# Patient Record
Sex: MALE | Race: WHITE | NOT HISPANIC OR LATINO | Employment: FULL TIME | URBAN - METROPOLITAN AREA
[De-identification: names, ages, dates, MRNs, and addresses within clinical notes are randomized per-mention and may not be internally consistent; named-entity substitution may affect disease eponyms.]

---

## 2023-09-26 ENCOUNTER — DOCUMENTATION (OUTPATIENT)
Dept: OTHER | Facility: HOSPITAL | Age: 58
End: 2023-09-26

## 2023-09-26 ENCOUNTER — OFFICE VISIT (OUTPATIENT)
Dept: PRIMARY CARE | Facility: CLINIC | Age: 58
End: 2023-09-26
Payer: COMMERCIAL

## 2023-09-26 ENCOUNTER — LAB (OUTPATIENT)
Dept: LAB | Facility: LAB | Age: 58
End: 2023-09-26
Payer: COMMERCIAL

## 2023-09-26 VITALS
DIASTOLIC BLOOD PRESSURE: 76 MMHG | BODY MASS INDEX: 29.42 KG/M2 | WEIGHT: 222 LBS | HEIGHT: 73 IN | SYSTOLIC BLOOD PRESSURE: 142 MMHG

## 2023-09-26 DIAGNOSIS — E78.5 HYPERLIPIDEMIA, UNSPECIFIED HYPERLIPIDEMIA TYPE: ICD-10-CM

## 2023-09-26 DIAGNOSIS — F17.200 SMOKING: ICD-10-CM

## 2023-09-26 DIAGNOSIS — J35.8 TONSILLAR MASS: ICD-10-CM

## 2023-09-26 DIAGNOSIS — R22.1 NECK MASS: ICD-10-CM

## 2023-09-26 DIAGNOSIS — J36 PERITONSILLAR ABSCESS: Primary | ICD-10-CM

## 2023-09-26 DIAGNOSIS — Z85.72 HX OF LYMPHOMA: ICD-10-CM

## 2023-09-26 PROBLEM — M51.9 ANNULAR TEAR OF INTERVERTEBRAL DISC: Status: ACTIVE | Noted: 2023-09-26

## 2023-09-26 PROBLEM — M54.2 NECK PAIN: Status: ACTIVE | Noted: 2023-09-26

## 2023-09-26 PROBLEM — M18.12 ARTHRITIS OF CARPOMETACARPAL (CMC) JOINT OF LEFT THUMB: Status: ACTIVE | Noted: 2023-09-26

## 2023-09-26 PROBLEM — M51.26 LUMBAR DISC DISPLACEMENT WITHOUT MYELOPATHY: Status: ACTIVE | Noted: 2023-09-26

## 2023-09-26 PROBLEM — I35.0 AORTIC STENOSIS, MILD: Status: ACTIVE | Noted: 2023-09-26

## 2023-09-26 PROBLEM — R01.1 SYSTOLIC MURMUR: Status: ACTIVE | Noted: 2023-09-26

## 2023-09-26 PROBLEM — M67.40 GANGLION CYST: Status: ACTIVE | Noted: 2023-09-26

## 2023-09-26 PROBLEM — R55 SYNCOPE: Status: ACTIVE | Noted: 2023-09-26

## 2023-09-26 PROBLEM — R74.8 ELEVATED LIVER ENZYMES: Status: ACTIVE | Noted: 2023-09-26

## 2023-09-26 PROBLEM — M50.90 CERVICAL DISC DISEASE: Status: ACTIVE | Noted: 2023-09-26

## 2023-09-26 PROBLEM — M79.645 PAIN OF LEFT THUMB: Status: ACTIVE | Noted: 2023-09-26

## 2023-09-26 PROBLEM — M54.9 BACK PAIN: Status: ACTIVE | Noted: 2023-09-26

## 2023-09-26 PROBLEM — L30.9 ECZEMA: Status: ACTIVE | Noted: 2023-09-26

## 2023-09-26 PROBLEM — C83.70: Status: ACTIVE | Noted: 2023-09-26

## 2023-09-26 PROBLEM — M54.16 RIGHT LUMBAR RADICULOPATHY: Status: ACTIVE | Noted: 2023-09-26

## 2023-09-26 PROBLEM — R10.9 ABDOMINAL PAIN: Status: ACTIVE | Noted: 2023-09-26

## 2023-09-26 PROBLEM — R94.31 ABNORMAL ECG: Status: ACTIVE | Noted: 2023-09-26

## 2023-09-26 LAB
ALANINE AMINOTRANSFERASE (SGPT) (U/L) IN SER/PLAS: 33 U/L (ref 10–52)
ALBUMIN (G/DL) IN SER/PLAS: 5 G/DL (ref 3.4–5)
ALKALINE PHOSPHATASE (U/L) IN SER/PLAS: 63 U/L (ref 33–120)
ANION GAP IN SER/PLAS: 12 MMOL/L (ref 10–20)
ASPARTATE AMINOTRANSFERASE (SGOT) (U/L) IN SER/PLAS: 18 U/L (ref 9–39)
BASOPHILS (10*3/UL) IN BLOOD BY AUTOMATED COUNT: 0.06 X10E9/L (ref 0–0.1)
BASOPHILS/100 LEUKOCYTES IN BLOOD BY AUTOMATED COUNT: 0.7 % (ref 0–2)
BILIRUBIN TOTAL (MG/DL) IN SER/PLAS: 0.5 MG/DL (ref 0–1.2)
CALCIUM (MG/DL) IN SER/PLAS: 10.7 MG/DL (ref 8.6–10.6)
CARBON DIOXIDE, TOTAL (MMOL/L) IN SER/PLAS: 29 MMOL/L (ref 21–32)
CHLORIDE (MMOL/L) IN SER/PLAS: 102 MMOL/L (ref 98–107)
CREATININE (MG/DL) IN SER/PLAS: 0.93 MG/DL (ref 0.5–1.3)
EOSINOPHILS (10*3/UL) IN BLOOD BY AUTOMATED COUNT: 0.23 X10E9/L (ref 0–0.7)
EOSINOPHILS/100 LEUKOCYTES IN BLOOD BY AUTOMATED COUNT: 2.6 % (ref 0–6)
ERYTHROCYTE DISTRIBUTION WIDTH (RATIO) BY AUTOMATED COUNT: 13.3 % (ref 11.5–14.5)
ERYTHROCYTE MEAN CORPUSCULAR HEMOGLOBIN CONCENTRATION (G/DL) BY AUTOMATED: 32.5 G/DL (ref 32–36)
ERYTHROCYTE MEAN CORPUSCULAR VOLUME (FL) BY AUTOMATED COUNT: 87 FL (ref 80–100)
ERYTHROCYTES (10*6/UL) IN BLOOD BY AUTOMATED COUNT: 6.01 X10E12/L (ref 4.5–5.9)
GFR MALE: >90 ML/MIN/1.73M2
GLUCOSE (MG/DL) IN SER/PLAS: 97 MG/DL (ref 74–99)
HEMATOCRIT (%) IN BLOOD BY AUTOMATED COUNT: 52.3 % (ref 41–52)
HEMOGLOBIN (G/DL) IN BLOOD: 17 G/DL (ref 13.5–17.5)
IMMATURE GRANULOCYTES/100 LEUKOCYTES IN BLOOD BY AUTOMATED COUNT: 0.4 % (ref 0–0.9)
LACTATE DEHYDROGENASE (U/L) IN SER/PLAS BY LAC->PYR RXN: 145 U/L (ref 84–246)
LEUKOCYTES (10*3/UL) IN BLOOD BY AUTOMATED COUNT: 8.9 X10E9/L (ref 4.4–11.3)
LYMPHOCYTES (10*3/UL) IN BLOOD BY AUTOMATED COUNT: 1.91 X10E9/L (ref 1.2–4.8)
LYMPHOCYTES/100 LEUKOCYTES IN BLOOD BY AUTOMATED COUNT: 21.5 % (ref 13–44)
MONOCYTES (10*3/UL) IN BLOOD BY AUTOMATED COUNT: 0.84 X10E9/L (ref 0.1–1)
MONOCYTES/100 LEUKOCYTES IN BLOOD BY AUTOMATED COUNT: 9.4 % (ref 2–10)
NEUTROPHILS (10*3/UL) IN BLOOD BY AUTOMATED COUNT: 5.81 X10E9/L (ref 1.2–7.7)
NEUTROPHILS/100 LEUKOCYTES IN BLOOD BY AUTOMATED COUNT: 65.4 % (ref 40–80)
NRBC (PER 100 WBCS) BY AUTOMATED COUNT: 0 /100 WBC (ref 0–0)
PLATELETS (10*3/UL) IN BLOOD AUTOMATED COUNT: 293 X10E9/L (ref 150–450)
POTASSIUM (MMOL/L) IN SER/PLAS: 5.4 MMOL/L (ref 3.5–5.3)
PROTEIN TOTAL: 8 G/DL (ref 6.4–8.2)
SODIUM (MMOL/L) IN SER/PLAS: 138 MMOL/L (ref 136–145)
UREA NITROGEN (MG/DL) IN SER/PLAS: 17 MG/DL (ref 6–23)

## 2023-09-26 PROCEDURE — 85025 COMPLETE CBC W/AUTO DIFF WBC: CPT

## 2023-09-26 PROCEDURE — 83615 LACTATE (LD) (LDH) ENZYME: CPT

## 2023-09-26 PROCEDURE — 36415 COLL VENOUS BLD VENIPUNCTURE: CPT

## 2023-09-26 PROCEDURE — 99214 OFFICE O/P EST MOD 30 MIN: CPT | Performed by: INTERNAL MEDICINE

## 2023-09-26 PROCEDURE — 80053 COMPREHEN METABOLIC PANEL: CPT

## 2023-09-26 RX ORDER — AMOXICILLIN AND CLAVULANATE POTASSIUM 875; 125 MG/1; MG/1
875 TABLET, FILM COATED ORAL 2 TIMES DAILY
Qty: 20 TABLET | Refills: 0 | Status: SHIPPED | OUTPATIENT
Start: 2023-09-26 | End: 2023-09-26

## 2023-09-26 RX ORDER — ASPIRIN 81 MG/1
81 TABLET ORAL DAILY
COMMUNITY

## 2023-09-26 RX ORDER — DOXYCYCLINE 100 MG/1
100 CAPSULE ORAL 2 TIMES DAILY
Qty: 20 CAPSULE | Refills: 0 | Status: SHIPPED | OUTPATIENT
Start: 2023-09-26 | End: 2023-09-26

## 2023-09-26 RX ORDER — IBUPROFEN 600 MG/1
600 TABLET ORAL 4 TIMES DAILY PRN
Qty: 90 TABLET | Refills: 5 | Status: SHIPPED | OUTPATIENT
Start: 2023-09-26 | End: 2023-09-26

## 2023-09-26 RX ORDER — LORATADINE 10 MG/1
10 TABLET ORAL DAILY
Qty: 30 TABLET | Refills: 0 | Status: SHIPPED | OUTPATIENT
Start: 2023-09-26 | End: 2023-09-26

## 2023-09-26 NOTE — PROGRESS NOTES
"Subjective   Patient ID: Gopal Blas is a 58 y.o. male who presents for Follow-up (Multiple medical issues).    This 58-year-old young man today came here for sore throat, congestion, left side tonsil enlargement.  He went to urgent care.  He was told he has a mass.  He got worried.  He came here follow-up on various conditions.  Appetite and weight are okay.  He can swallow food very well.  His swelling is mostly on the left side.    I have personally reviewed the patient's Past Medical History, Medications, Allergies, Social History, and Family History in the EMR.    Review of Systems   All other systems reviewed and are negative.    Objective   /76   Ht 1.854 m (6' 1\")   Wt 101 kg (222 lb)   BMI 29.29 kg/m²     Physical Exam  Vitals reviewed.   HENT:      Nose:      Comments: Postnasal drip.     Mouth/Throat:      Comments: Throat: Congested.  Cardiovascular:      Heart sounds: Normal heart sounds, S1 normal and S2 normal. No murmur heard.     No friction rub.   Pulmonary:      Effort: Pulmonary effort is normal.      Breath sounds: Normal breath sounds and air entry.   Abdominal:      Palpations: There is no hepatomegaly, splenomegaly or mass.   Musculoskeletal:      Right lower leg: No edema.      Left lower leg: No edema.   Lymphadenopathy:      Lower Body: No right inguinal adenopathy. No left inguinal adenopathy.   Neurological:      Cranial Nerves: Cranial nerves 2-12 are intact.      Sensory: No sensory deficit.      Motor: Motor function is intact.      Deep Tendon Reflexes: Reflexes are normal and symmetric.     Assessment/Plan   Problem List Items Addressed This Visit             ICD-10-CM       Cardiac and Vasculature    Hyperlipidemia E78.5     Other Visit Diagnoses         Codes    Peritonsillar abscess    -  Primary J36    Neck mass     R22.1    Relevant Medications    amoxicillin-pot clavulanate (Augmentin) 875-125 mg tablet    doxycycline (Vibramycin) 100 mg capsule    loratadine " (Claritin) 10 mg tablet    ibuprofen 600 mg tablet    Other Relevant Orders    CT soft tissue neck w IV contrast    Hx of lymphoma     Z85.72    Relevant Orders    CBC and Auto Differential    Comprehensive metabolic panel    Lactate dehydrogenase    Tonsillar mass     J35.8    Smoking     F17.200        1. Left peritonsillar abscess, infection.  Salt water gargles, steam.  Augmentin, doxycycline, Claritin, Motrin given.  2. History of Burkitt’s lymphoma, unusual.  Immediate blood count, chemistry ordered.  3. Mass in tonsil that really bother, but externally right tonsillar area look bigger to me than left.  Internally, left tonsil is enlarged.  4. Considering Burkitt’s lymphoma history, it is reasonable to do CT scan soft tissue neck to evaluate.  5. Smoking.  We will check on CT scan of the lungs to make sure there is nothing there.  6. I shall see him back in a week after testing.  7. Welcome back to my office.    Scribe Attestation  By signing my name below, I, Marivel Rollins, Femi   attest that this documentation has been prepared under the direction and in the presence of Gwen Collins MD.

## 2023-09-27 PROBLEM — J36 PERITONSILLAR ABSCESS: Status: ACTIVE | Noted: 2023-09-27

## 2023-09-27 PROBLEM — Z85.72 HX OF LYMPHOMA: Status: ACTIVE | Noted: 2023-09-27

## 2023-09-27 PROBLEM — J35.8 TONSILLAR MASS: Status: ACTIVE | Noted: 2023-09-27

## 2023-09-27 PROBLEM — R22.1 NECK MASS: Status: ACTIVE | Noted: 2023-09-27

## 2023-10-02 ENCOUNTER — ANCILLARY PROCEDURE (OUTPATIENT)
Dept: RADIOLOGY | Facility: CLINIC | Age: 58
End: 2023-10-02
Payer: COMMERCIAL

## 2023-10-02 DIAGNOSIS — R22.1 NECK MASS: ICD-10-CM

## 2023-10-02 PROCEDURE — 70491 CT SOFT TISSUE NECK W/DYE: CPT

## 2023-10-02 PROCEDURE — 2550000001 HC RX 255 CONTRASTS: Performed by: INTERNAL MEDICINE

## 2023-10-02 RX ADMIN — IOHEXOL 100 ML: 350 INJECTION, SOLUTION INTRAVENOUS at 08:59

## 2023-10-04 ENCOUNTER — OFFICE VISIT (OUTPATIENT)
Dept: PRIMARY CARE | Facility: CLINIC | Age: 58
End: 2023-10-04
Payer: COMMERCIAL

## 2023-10-04 VITALS
HEIGHT: 73 IN | WEIGHT: 222 LBS | DIASTOLIC BLOOD PRESSURE: 72 MMHG | SYSTOLIC BLOOD PRESSURE: 122 MMHG | BODY MASS INDEX: 29.42 KG/M2

## 2023-10-04 DIAGNOSIS — R59.0 CERVICAL LYMPHADENOPATHY: ICD-10-CM

## 2023-10-04 DIAGNOSIS — E83.52 SERUM CALCIUM ELEVATED: ICD-10-CM

## 2023-10-04 DIAGNOSIS — J02.9 PHARYNGITIS, UNSPECIFIED ETIOLOGY: Primary | ICD-10-CM

## 2023-10-04 PROCEDURE — 99214 OFFICE O/P EST MOD 30 MIN: CPT | Performed by: INTERNAL MEDICINE

## 2023-10-05 ENCOUNTER — LAB (OUTPATIENT)
Dept: LAB | Facility: LAB | Age: 58
End: 2023-10-05
Payer: COMMERCIAL

## 2023-10-05 DIAGNOSIS — R59.0 CERVICAL LYMPHADENOPATHY: ICD-10-CM

## 2023-10-05 LAB
ALBUMIN SERPL BCP-MCNC: 4.8 G/DL (ref 3.4–5)
ALP SERPL-CCNC: 53 U/L (ref 33–120)
ALT SERPL W P-5'-P-CCNC: 38 U/L (ref 10–52)
ANION GAP SERPL CALC-SCNC: 15 MMOL/L (ref 10–20)
AST SERPL W P-5'-P-CCNC: 22 U/L (ref 9–39)
BILIRUB SERPL-MCNC: 0.6 MG/DL (ref 0–1.2)
BUN SERPL-MCNC: 15 MG/DL (ref 6–23)
CALCIUM SERPL-MCNC: 10.4 MG/DL (ref 8.6–10.6)
CHLORIDE SERPL-SCNC: 104 MMOL/L (ref 98–107)
CO2 SERPL-SCNC: 25 MMOL/L (ref 21–32)
CREAT SERPL-MCNC: 0.89 MG/DL (ref 0.5–1.3)
GFR SERPL CREATININE-BSD FRML MDRD: >90 ML/MIN/1.73M*2
GLUCOSE SERPL-MCNC: 86 MG/DL (ref 74–99)
POTASSIUM SERPL-SCNC: 4.8 MMOL/L (ref 3.5–5.3)
PROT SERPL-MCNC: 7.4 G/DL (ref 6.4–8.2)
PTH-INTACT SERPL-MCNC: 51.6 PG/ML (ref 18.5–88)
SODIUM SERPL-SCNC: 139 MMOL/L (ref 136–145)

## 2023-10-05 PROCEDURE — 80053 COMPREHEN METABOLIC PANEL: CPT

## 2023-10-05 PROCEDURE — 36415 COLL VENOUS BLD VENIPUNCTURE: CPT

## 2023-10-05 PROCEDURE — 82397 CHEMILUMINESCENT ASSAY: CPT

## 2023-10-05 PROCEDURE — 83970 ASSAY OF PARATHORMONE: CPT

## 2023-10-05 NOTE — PROGRESS NOTES
"Subjective   Patient ID: Gopal Blas is a 58 y.o. male who presents for Follow-up (multiple issues.).    This 58-year-old gentleman man today came here for multiple issues.  He is somewhat better.  He feels his throat is getting better, but not completely gone.  He had a CT scan done.  He came here follow-up.  He has a history of Burkitt's lymphoma.  He came for follow-up on various conditions.    I have personally reviewed the patient's Past Medical History, Medications, Allergies, Social History, and Family History in the EMR.    Review of Systems   All other systems reviewed and are negative.    Objective   /72   Ht 1.854 m (6' 1\")   Wt 101 kg (222 lb)   BMI 29.29 kg/m²     Physical Exam  Vitals reviewed.   HENT:      Nose: Congestion present.      Comments: Postnasal drip.     Mouth/Throat:      Comments: Throat congested.  Cardiovascular:      Heart sounds: Normal heart sounds, S1 normal and S2 normal. No murmur heard.     No friction rub.   Pulmonary:      Effort: Pulmonary effort is normal.      Breath sounds: Normal breath sounds and air entry.   Abdominal:      Palpations: There is no hepatomegaly, splenomegaly or mass.   Musculoskeletal:      Right lower leg: No edema.      Left lower leg: No edema.   Lymphadenopathy:      Cervical: Cervical adenopathy present.      Lower Body: No right inguinal adenopathy. No left inguinal adenopathy.   Neurological:      Cranial Nerves: Cranial nerves 2-12 are intact.      Sensory: No sensory deficit.      Motor: Motor function is intact.      Deep Tendon Reflexes: Reflexes are normal and symmetric.     LAB WORK: Laboratory testing, CT scan reviewed.    Assessment/Plan   Problem List Items Addressed This Visit    None  Visit Diagnoses         Codes    Pharyngitis, unspecified etiology    -  Primary J02.9    Cervical lymphadenopathy     R59.0    Relevant Orders    Comprehensive Metabolic Panel    Parathyroid Hormone, Intact    PTH-Related Peptide    Referral " to ENT    Serum calcium elevated     E83.52        1. Pharyngitis, congestion, improving with antibiotic, good.  2. Cervical lymphadenopathy ______ not better.  LDH normal.  I am comfortable referring to Dr. Kamara for nasopharyngoscopy and possible biopsy.  3. Calcium, borderline high.  Concerning for Burkitt’s lymphoma, I am going to work him up for parathyroid hormone.  Work-up ordered.  4. I shall see him back in about four weeks’ time.  Meanwhile, I will do my best to get him seen by Dr. Kamara for nasopharyngoscopy and possible biopsy.    Scribe Attestation  By signing my name below, I, Femi Ledbetter attest that this documentation has been prepared under the direction and in the presence of Gwen Collins MD.

## 2023-10-06 PROBLEM — R06.09 DOE (DYSPNEA ON EXERTION): Status: ACTIVE | Noted: 2023-10-06

## 2023-10-06 PROBLEM — I25.10 CORONARY ARTERIOSCLEROSIS: Status: ACTIVE | Noted: 2023-10-06

## 2023-10-06 PROBLEM — R03.0 ELEVATED BLOOD PRESSURE READING: Status: ACTIVE | Noted: 2023-10-06

## 2023-10-06 PROBLEM — L30.9 DERMATITIS, UNSPECIFIED: Status: ACTIVE | Noted: 2021-08-31

## 2023-10-06 RX ORDER — ACETAMINOPHEN 500 MG
1 TABLET ORAL DAILY
COMMUNITY

## 2023-10-09 NOTE — PROGRESS NOTES
History of Present Illness    Gopal AICHA Blas is a 58 y.o. male seen at the request of his family physician.  Approximately 10 days ago he presented with a throat discomfort as well as of significant amount of swelling on the left side of his throat.  He was eventually put on antibiotics.  His symptoms got better.  He had a CT scan of his neck done on October 2, 2023.  I personally reviewed that scan that basically does not show anything worrisome besides may be a mild sinusitis.  The patient has noticed some swelling in his neck and is somewhat worried about that.  On further questioning the patient admits to significant snoring.    Past Medical History  His past medical history review of system shows elevated cholesterol, elevated blood pressure.  He also has a history of Burkitt's lymphoma when he was 7 years old.  has a current medication list which includes the following prescription(s): aspirin, cholecalciferol, doxycycline, ibuprofen, loratadine, rosuvastatin, and rosuvastatin calcium.     reports that he has been smoking cigarettes. He does not have any smokeless tobacco history on file. He reports that he does not drink alcohol and does not use drugs.  He works in a car dealership.  He is here today with his wife.        Physical Exam    The patient is alert and oriented. Examination of the external ears, ear canals, and eardrums, is within normal limits. Examination of the anterior and external nose is negative. Examination of the oral cavity and oropharynx is normal. There is no evidence of any mucosal lesions. There is good mobility of the tongue and palate.  He does have significant restriction of the oropharyngeal and left there is good mandibular excursion. Palpation of the parotid, neck, and thyroid field fails to show any worrisome masses or adenopathies.  What the patient feels are somewhat prominent submandibular glands.    A flexible laryngoscopy was carried out. Under topical Xylocaine and  Zohaib-Synephrine the scope was introduced through the nostril. The nasopharynx, base of tongue, hypopharynx, and larynx are visualized.  The vocal cords are normally mobile. There is no pooling of secretions in the piriform sinuses. There is no evidence of any mucosal lesions.  The airway is not restricted in the area of the base of tongue    Assessment and Plan    Recent oropharyngeal infection.  This has resolved.  His prominent neck masses correspond to his submandibular glands.  There is nothing worrisome on CT scan.  I would not pursue that any further.    Significant snoring and airway restriction on examination.  A sleep study will be obtained.    I will see him in 4 months.

## 2023-10-10 ENCOUNTER — OFFICE VISIT (OUTPATIENT)
Dept: OTOLARYNGOLOGY | Facility: CLINIC | Age: 58
End: 2023-10-10
Payer: COMMERCIAL

## 2023-10-10 VITALS — BODY MASS INDEX: 29.69 KG/M2 | WEIGHT: 224 LBS | HEIGHT: 73 IN

## 2023-10-10 DIAGNOSIS — R22.1 NECK MASS: Primary | ICD-10-CM

## 2023-10-10 DIAGNOSIS — R06.83 SNORING: ICD-10-CM

## 2023-10-10 PROCEDURE — 31575 DIAGNOSTIC LARYNGOSCOPY: CPT | Performed by: OTOLARYNGOLOGY

## 2023-10-10 PROCEDURE — 99203 OFFICE O/P NEW LOW 30 MIN: CPT | Performed by: OTOLARYNGOLOGY

## 2023-10-10 ASSESSMENT — PATIENT HEALTH QUESTIONNAIRE - PHQ9
SUM OF ALL RESPONSES TO PHQ9 QUESTIONS 1 AND 2: 0
2. FEELING DOWN, DEPRESSED OR HOPELESS: NOT AT ALL
1. LITTLE INTEREST OR PLEASURE IN DOING THINGS: NOT AT ALL

## 2023-10-11 LAB — PTH RELATED PROT SERPL-SCNC: <0.4 PMOL/L

## 2023-11-08 ENCOUNTER — PHARMACY VISIT (OUTPATIENT)
Dept: PHARMACY | Facility: CLINIC | Age: 58
End: 2023-11-08
Payer: COMMERCIAL

## 2023-11-10 ENCOUNTER — PHARMACY VISIT (OUTPATIENT)
Dept: PHARMACY | Facility: CLINIC | Age: 58
End: 2023-11-10
Payer: COMMERCIAL

## 2023-11-10 PROCEDURE — RXOTC WILLOW AMBULATORY OTC CHARGE

## 2023-11-10 PROCEDURE — RXMED WILLOW AMBULATORY MEDICATION CHARGE

## 2024-01-28 ENCOUNTER — HOSPITAL ENCOUNTER (EMERGENCY)
Facility: HOSPITAL | Age: 59
Discharge: HOME | End: 2024-01-29
Attending: EMERGENCY MEDICINE
Payer: COMMERCIAL

## 2024-01-28 DIAGNOSIS — J36 PERITONSILLAR ABSCESS: Primary | ICD-10-CM

## 2024-01-28 PROCEDURE — 99284 EMERGENCY DEPT VISIT MOD MDM: CPT | Performed by: EMERGENCY MEDICINE

## 2024-01-28 RX ORDER — KETOROLAC TROMETHAMINE 30 MG/ML
15 INJECTION, SOLUTION INTRAMUSCULAR; INTRAVENOUS ONCE
Status: COMPLETED | OUTPATIENT
Start: 2024-01-29 | End: 2024-01-29

## 2024-01-28 RX ORDER — DEXAMETHASONE SODIUM PHOSPHATE 100 MG/10ML
10 INJECTION INTRAMUSCULAR; INTRAVENOUS ONCE
Status: COMPLETED | OUTPATIENT
Start: 2024-01-29 | End: 2024-01-29

## 2024-01-28 RX ORDER — CEFTRIAXONE 1 G/50ML
1 INJECTION, SOLUTION INTRAVENOUS ONCE
Status: COMPLETED | OUTPATIENT
Start: 2024-01-29 | End: 2024-01-29

## 2024-01-28 ASSESSMENT — LIFESTYLE VARIABLES
HAVE YOU EVER FELT YOU SHOULD CUT DOWN ON YOUR DRINKING: NO
EVER FELT BAD OR GUILTY ABOUT YOUR DRINKING: NO
HAVE PEOPLE ANNOYED YOU BY CRITICIZING YOUR DRINKING: NO
EVER HAD A DRINK FIRST THING IN THE MORNING TO STEADY YOUR NERVES TO GET RID OF A HANGOVER: NO
REASON UNABLE TO ASSESS: NO

## 2024-01-28 NOTE — Clinical Note
Gopal Blas was seen and treated in our emergency department on 1/28/2024.  He may return to work on 01/31/2024.  Patient may return to work on the above date without restrictions     If you have any questions or concerns, please don't hesitate to call.      Tito Peacock, DO

## 2024-01-29 ENCOUNTER — PHARMACY VISIT (OUTPATIENT)
Dept: PHARMACY | Facility: CLINIC | Age: 59
End: 2024-01-29
Payer: COMMERCIAL

## 2024-01-29 ENCOUNTER — APPOINTMENT (OUTPATIENT)
Dept: RADIOLOGY | Facility: HOSPITAL | Age: 59
End: 2024-01-29
Payer: COMMERCIAL

## 2024-01-29 ENCOUNTER — OFFICE VISIT (OUTPATIENT)
Dept: OTOLARYNGOLOGY | Facility: CLINIC | Age: 59
End: 2024-01-29
Payer: COMMERCIAL

## 2024-01-29 VITALS — TEMPERATURE: 96.8 F | HEIGHT: 73 IN | WEIGHT: 224 LBS | BODY MASS INDEX: 29.69 KG/M2

## 2024-01-29 VITALS
BODY MASS INDEX: 30.48 KG/M2 | RESPIRATION RATE: 20 BRPM | HEIGHT: 73 IN | TEMPERATURE: 97.3 F | OXYGEN SATURATION: 93 % | SYSTOLIC BLOOD PRESSURE: 121 MMHG | HEART RATE: 89 BPM | DIASTOLIC BLOOD PRESSURE: 76 MMHG | WEIGHT: 230 LBS

## 2024-01-29 DIAGNOSIS — J03.90 ACUTE TONSILLITIS, UNSPECIFIED ETIOLOGY: ICD-10-CM

## 2024-01-29 DIAGNOSIS — R06.83 SNORING: Primary | ICD-10-CM

## 2024-01-29 DIAGNOSIS — J35.1 TONSILLAR HYPERTROPHY: ICD-10-CM

## 2024-01-29 LAB
ALBUMIN SERPL-MCNC: 4.1 G/DL (ref 3.5–5)
ALP BLD-CCNC: 80 U/L (ref 35–125)
ALT SERPL-CCNC: 19 U/L (ref 5–40)
ANION GAP SERPL CALC-SCNC: 11 MMOL/L
AST SERPL-CCNC: 18 U/L (ref 5–40)
BASOPHILS # BLD AUTO: 0.06 X10*3/UL (ref 0–0.1)
BASOPHILS NFR BLD AUTO: 0.5 %
BILIRUB SERPL-MCNC: <0.2 MG/DL (ref 0.1–1.2)
BUN SERPL-MCNC: 16 MG/DL (ref 8–25)
CALCIUM SERPL-MCNC: 9.6 MG/DL (ref 8.5–10.4)
CHLORIDE SERPL-SCNC: 103 MMOL/L (ref 97–107)
CO2 SERPL-SCNC: 24 MMOL/L (ref 24–31)
CREAT SERPL-MCNC: 1 MG/DL (ref 0.4–1.6)
EGFRCR SERPLBLD CKD-EPI 2021: 88 ML/MIN/1.73M*2
EOSINOPHIL # BLD AUTO: 0.42 X10*3/UL (ref 0–0.7)
EOSINOPHIL NFR BLD AUTO: 3.7 %
ERYTHROCYTE [DISTWIDTH] IN BLOOD BY AUTOMATED COUNT: 13.3 % (ref 11.5–14.5)
GLUCOSE SERPL-MCNC: 130 MG/DL (ref 65–99)
HCT VFR BLD AUTO: 45 % (ref 41–52)
HGB BLD-MCNC: 15.1 G/DL (ref 13.5–17.5)
IMM GRANULOCYTES # BLD AUTO: 0.04 X10*3/UL (ref 0–0.7)
IMM GRANULOCYTES NFR BLD AUTO: 0.4 % (ref 0–0.9)
LYMPHOCYTES # BLD AUTO: 2.18 X10*3/UL (ref 1.2–4.8)
LYMPHOCYTES NFR BLD AUTO: 19.1 %
MCH RBC QN AUTO: 28.7 PG (ref 26–34)
MCHC RBC AUTO-ENTMCNC: 33.6 G/DL (ref 32–36)
MCV RBC AUTO: 85 FL (ref 80–100)
MONOCYTES # BLD AUTO: 1.4 X10*3/UL (ref 0.1–1)
MONOCYTES NFR BLD AUTO: 12.3 %
NEUTROPHILS # BLD AUTO: 7.3 X10*3/UL (ref 1.2–7.7)
NEUTROPHILS NFR BLD AUTO: 64 %
NRBC BLD-RTO: 0 /100 WBCS (ref 0–0)
PLATELET # BLD AUTO: 266 X10*3/UL (ref 150–450)
POTASSIUM SERPL-SCNC: 4.1 MMOL/L (ref 3.4–5.1)
PROT SERPL-MCNC: 7.1 G/DL (ref 5.9–7.9)
RBC # BLD AUTO: 5.27 X10*6/UL (ref 4.5–5.9)
SODIUM SERPL-SCNC: 138 MMOL/L (ref 133–145)
WBC # BLD AUTO: 11.4 X10*3/UL (ref 4.4–11.3)

## 2024-01-29 PROCEDURE — 96375 TX/PRO/DX INJ NEW DRUG ADDON: CPT | Mod: 59

## 2024-01-29 PROCEDURE — 99203 OFFICE O/P NEW LOW 30 MIN: CPT | Performed by: OTOLARYNGOLOGY

## 2024-01-29 PROCEDURE — 96365 THER/PROPH/DIAG IV INF INIT: CPT | Mod: 59

## 2024-01-29 PROCEDURE — 31575 DIAGNOSTIC LARYNGOSCOPY: CPT | Performed by: OTOLARYNGOLOGY

## 2024-01-29 PROCEDURE — 2550000001 HC RX 255 CONTRASTS: Performed by: EMERGENCY MEDICINE

## 2024-01-29 PROCEDURE — 70491 CT SOFT TISSUE NECK W/DYE: CPT

## 2024-01-29 PROCEDURE — 36415 COLL VENOUS BLD VENIPUNCTURE: CPT | Performed by: EMERGENCY MEDICINE

## 2024-01-29 PROCEDURE — 2500000004 HC RX 250 GENERAL PHARMACY W/ HCPCS (ALT 636 FOR OP/ED): Performed by: EMERGENCY MEDICINE

## 2024-01-29 PROCEDURE — 70491 CT SOFT TISSUE NECK W/DYE: CPT | Mod: FOREIGN READ | Performed by: RADIOLOGY

## 2024-01-29 PROCEDURE — RXMED WILLOW AMBULATORY MEDICATION CHARGE

## 2024-01-29 PROCEDURE — 4004F PT TOBACCO SCREEN RCVD TLK: CPT | Performed by: OTOLARYNGOLOGY

## 2024-01-29 PROCEDURE — 80053 COMPREHEN METABOLIC PANEL: CPT | Performed by: EMERGENCY MEDICINE

## 2024-01-29 PROCEDURE — 85025 COMPLETE CBC W/AUTO DIFF WBC: CPT | Performed by: EMERGENCY MEDICINE

## 2024-01-29 RX ORDER — AMOXICILLIN AND CLAVULANATE POTASSIUM 875; 125 MG/1; MG/1
1 TABLET, FILM COATED ORAL EVERY 12 HOURS
Qty: 14 TABLET | Refills: 0 | Status: SHIPPED | OUTPATIENT
Start: 2024-01-29 | End: 2024-02-05

## 2024-01-29 RX ORDER — OXYCODONE AND ACETAMINOPHEN 5; 325 MG/1; MG/1
1 TABLET ORAL EVERY 6 HOURS PRN
Qty: 8 TABLET | Refills: 0 | Status: SHIPPED | OUTPATIENT
Start: 2024-01-29 | End: 2024-02-01

## 2024-01-29 RX ORDER — PREDNISONE 20 MG/1
TABLET ORAL
Qty: 26 TABLET | Refills: 0 | Status: SHIPPED | OUTPATIENT
Start: 2024-01-29 | End: 2024-02-09

## 2024-01-29 RX ADMIN — DEXAMETHASONE SODIUM PHOSPHATE 10 MG: 10 INJECTION INTRAMUSCULAR; INTRAVENOUS at 00:01

## 2024-01-29 RX ADMIN — CEFTRIAXONE SODIUM 1 G: 1 INJECTION, SOLUTION INTRAVENOUS at 00:01

## 2024-01-29 RX ADMIN — IOHEXOL 75 ML: 350 INJECTION, SOLUTION INTRAVENOUS at 00:50

## 2024-01-29 RX ADMIN — KETOROLAC TROMETHAMINE 15 MG: 30 INJECTION INTRAMUSCULAR; INTRAVENOUS at 00:01

## 2024-01-29 ASSESSMENT — PATIENT HEALTH QUESTIONNAIRE - PHQ9
SUM OF ALL RESPONSES TO PHQ9 QUESTIONS 1 & 2: 0
2. FEELING DOWN, DEPRESSED OR HOPELESS: NOT AT ALL
1. LITTLE INTEREST OR PLEASURE IN DOING THINGS: NOT AT ALL

## 2024-01-29 ASSESSMENT — COLUMBIA-SUICIDE SEVERITY RATING SCALE - C-SSRS
6. HAVE YOU EVER DONE ANYTHING, STARTED TO DO ANYTHING, OR PREPARED TO DO ANYTHING TO END YOUR LIFE?: NO
2. HAVE YOU ACTUALLY HAD ANY THOUGHTS OF KILLING YOURSELF?: NO
1. IN THE PAST MONTH, HAVE YOU WISHED YOU WERE DEAD OR WISHED YOU COULD GO TO SLEEP AND NOT WAKE UP?: NO

## 2024-01-29 ASSESSMENT — PAIN - FUNCTIONAL ASSESSMENT: PAIN_FUNCTIONAL_ASSESSMENT: 0-10

## 2024-01-29 NOTE — PROGRESS NOTES
MALLIKA Blas is a 58 y.o. male with a history of Burkitt's lymphoma as a child.  He presented to the emergency room last night with considerable swelling and sore throat on the left-hand side.  The symptoms began yesterday morning.  He has a history of similar symptoms back in October for which she saw Dr. Kamara and had CAT scan.  Nothing concerning was seen on the CAT scan.  He had another CAT scan in the emergency room last night demonstrating a small abscess in the left peritonsillar area although it is reportedly in the lingual tonsil area on the radiologist report.  He had considerable swelling on the CT scan.  He was treated with IV antibiotics and steroids and had marked improvement.  He is feeling dramatically better today.  He is swallowing and breathing well now.  He did not really have any history of tonsil problems prior to October.  He does snore.  Sleep study is recommended for him by Dr. Kamara and he has been reluctant to have this done      Past Medical History:   Diagnosis Date    Cancer (CMS/[facility])     High cholesterol     History of Burkitt's lymphoma     Radiculopathy, cervical region     Cervical radiculopathy    Sinus infection             Medications:     Current Outpatient Medications:     amoxicillin-pot clavulanate (Augmentin) 875-125 mg tablet, Take 1 tablet (875 mg) by mouth every 12 hours for 7 days., Disp: 14 tablet, Rfl: 0    aspirin 81 mg EC tablet, Take 1 tablet (81 mg) by mouth once daily., Disp: , Rfl:     cholecalciferol (Vitamin D-3) 5,000 Units tablet, Take 1 tablet (5,000 Units) by mouth once daily., Disp: , Rfl:     doxycycline (Vibramycin) 100 mg capsule, TAKE 1 CAPSULE BY MOUTH TWO TIMES A DAY FOR 10 DAYS. TAKE WITH AT LEAST 8 OUNCES OF WATER, DO NOT LIE DOWN FOR 30 MINUTES AFTER, Disp: 20 capsule, Rfl: 0    ibuprofen 600 mg tablet, TAKE 1 TABLET BY MOUTH FOUR TIMES A DAY AS NEEDED FOR MILD PAIN, Disp: 90 tablet, Rfl: 5    loratadine (Claritin) 10 mg tablet, TAKE 1  "TABLET BY MOUTH ONCE DAILY, Disp: 30 tablet, Rfl: 0    oxyCODONE-acetaminophen (Percocet) 5-325 mg tablet, Take 1 tablet by mouth every 6 hours if needed for severe pain (7 - 10) for up to 3 days., Disp: 8 tablet, Rfl: 0    predniSONE (Deltasone) 20 mg tablet, Take 4 tablets (80 mg) by mouth once daily for 2 days, THEN 3 tablets (60 mg) once daily for 3 days, THEN 2 tablets (40 mg) once daily for 3 days, THEN 1 tablet (20 mg) once daily for 3 days., Disp: 26 tablet, Rfl: 0    rosuvastatin (Crestor) 20 mg tablet, TAKE 1 TABLET DAILY., Disp: 90 tablet, Rfl: 1    rosuvastatin calcium (CRESTOR ORAL), Take 20 mg by mouth once daily., Disp: , Rfl:   No current facility-administered medications for this visit.     Allergies:  Allergies   Allergen Reactions    Simvastatin Other     Elevated LFTs    Tramadol Hcl Unknown        Physical Exam:  Last Recorded Vitals  Temperature 36 °C (96.8 °F), height 1.854 m (6' 1\"), weight 102 kg (224 lb).  General:     General appearance: Well-developed, well-nourished in no acute distress.       Voice:  normal       Head/face: Normal appearance; nontender to palpation     Facial nerve function: Normal and symmetric bilaterally.    Oral/oropharynx:     Oral vestibule: Normal labial and gingival mucosa     Tongue/floor of mouth: Normal without lesion     Oropharynx: Clear.  No lesions present of the hard/soft palate, posterior pharynx 2+ tonsils bilaterally.  Erythema left soft palate and some mild fullness of the left peritonsillar area.  Uvula midline.  Tonsils cryptic bilaterally    Neck:     Neck: Normal appearance, trachea midline     Salivary glands: Normal to palpation bilaterally     Lymph nodes: No cervical lymphadenopathy to palpation     Thyroid: No thyromegaly.  No palpable nodules     Range of motion: Normal    Neurological:     Cortical functions: Alert and oriented x3, appropriate affect       Larynx/hypopharynx:     Laryngeal findings: Mirror exam inadequate or limited " secondary to enlarged base of tongue and/or excessive gagging.  Flexible laryngoscopy performed secondary to inadequate mirror examination.  Verbal informed consent the flexible laryngoscope was passed through the nasal cavity.  Normal epiglottis, aryepiglottic folds, arytenoids, false vocal cords, true vocal cords.  Normal vocal cord mobility bilaterally was identified.  No mucosal masses or lesions.    Nasopharynx:     Nasopharynx: Normal    Ear:     Ear canal: Normal bilaterally     Tympanic membrane: Intact and mobile bilaterally     Pinna: Normal bilaterally     Hearing:  Gross hearing assessment normal by voice    Nose:     Visualized using: Anterior rhinoscopy     Nasopharynx: Inadequate mirror exam secondary to gag, anatomy.       Nasal dorsum: Nontraumatic midline appearance     Septum: Midline     Inferior turbinates: Normally sized     Mucosa: Bilateral, pink, normal appearing       Assessment/Plan   Left tonsillitis with small peritonsillar abscess.  Seems to be considerably improved on antibiotics and steroids.  I recommended monitoring this and we will recheck him at the conclusion of his course.  I would like to see him sooner if he has any worsening before then.  Regarding long-term management, with his history of these 2 episodes within short order, there may be a role for more to be done but we discussed treating medically for now and seeing how he continues to do.         Tyler Mcfadden MD

## 2024-01-29 NOTE — ED PROVIDER NOTES
HPI   Chief Complaint   Patient presents with    Oral Swelling     Left sided throat swelling       Patient presents emerged apartment today with complaints of having swelling in his throat and palate.  Patient is complaining of having a sore throat.  Patient states that he did have something similar to this in the past, did have to put on antibiotics for it patient did not have to receive surgery last time.  The patient states that he feels as though his throat is closing up.  Patient was brought directly back into the ED after arriving here with the above complaints.  Patient denies fevers or chills.  Patient denies nausea or vomiting.  Patient denies other associated symptoms.  Patient states that he felt fine prior to going to bed.  Patient states that he woke up with a sore throat and a swollen throat.        [place] Coma Scale Score: 15                  Patient History   Past Medical History:   Diagnosis Date    Cancer (CMS/[facility])     High cholesterol     History of Burkitt's lymphoma     Radiculopathy, cervical region     Cervical radiculopathy    Sinus infection      Past Surgical History:   Procedure Laterality Date    CARPAL TUNNEL RELEASE  06/23/2016    Neuroplasty Decompression Median Nerve At Carpal Tunnel    OTHER SURGICAL HISTORY  08/09/2014    Arthrodesis Cervical    OTHER SURGICAL HISTORY  09/24/2014    Arthrodesis Cervical    OTHER SURGICAL HISTORY  09/24/2014    Post Spinal Diskectomy, Osteophytectomy One Lumbar Interspac    OTHER SURGICAL HISTORY  06/23/2016    Hemilaminectomy With Disc Removal Lumbar Re-Exploration    OTHER SURGICAL HISTORY  07/07/2018    Excision Of Thymus     Family History   Problem Relation Name Age of Onset    Coronary artery disease Mother      Hypertension Mother      Hyperlipidemia Mother      No Known Problems Father       Social History     Tobacco Use    Smoking status: Every Day     Types: Cigarettes    Smokeless tobacco: Never   Substance Use Topics    Alcohol use:  Never    Drug use: Never       Physical Exam   ED Triage Vitals [01/29/24 0014]   Temperature Heart Rate Respirations BP   36.3 °C (97.3 °F) 89 20 134/77      Pulse Ox Temp Source Heart Rate Source Patient Position   99 % Oral Monitor --      BP Location FiO2 (%)     -- --       Physical Exam  Vitals and nursing note reviewed.   Constitutional:       General: He is not in acute distress.     Appearance: He is well-developed.   HENT:      Head: Normocephalic and atraumatic.      Mouth/Throat:      Comments: Uvula is shifted to the right, with fullness of the left soft palate.  Eyes:      Conjunctiva/sclera: Conjunctivae normal.   Cardiovascular:      Rate and Rhythm: Normal rate and regular rhythm.      Heart sounds: No murmur heard.  Pulmonary:      Effort: Pulmonary effort is normal. No respiratory distress.      Breath sounds: Normal breath sounds.   Abdominal:      Palpations: Abdomen is soft.      Tenderness: There is no abdominal tenderness.   Musculoskeletal:         General: No swelling.      Cervical back: Neck supple.   Skin:     General: Skin is warm and dry.      Capillary Refill: Capillary refill takes less than 2 seconds.   Neurological:      Mental Status: He is alert.         ED Course & MDM   ED Course as of 01/29/24 0228 Mon Jan 29, 2024   0151 I did speak with Dr. Mcfadden over the phone, he is going to review the patient's CT scan, and try to go over the plan for this patient. [FR]   0222 Current is going to see the patient in the office today.  He is recommending not trying to drain the abscess here in the emergency department.  He is recommending steroids and antibiotics for home. [FR]      ED Course User Index  [FR] Tito Peacock DO         Diagnoses as of 01/29/24 0228   Peritonsillar abscess       Medical Decision Making  After my initial evaluation, patient, my estimation, will need to have a CT of the soft tissue of the neck in order to rule out possibility of a pharyngeal abscess that  will require surgery.  Patient will be given steroids, and medication to help with the swelling.  Patient was given IV antibiotics.  Patient will be given IV fluids.    Amount and/or Complexity of Data Reviewed  Labs:  Decision-making details documented in ED Course.  Radiology:  Decision-making details documented in ED Course.  ECG/medicine tests:  Decision-making details documented in ED Course.        Procedure  Procedures     Tito Peacock DO  01/29/24 0228

## 2024-03-11 ENCOUNTER — PHARMACY VISIT (OUTPATIENT)
Dept: PHARMACY | Facility: CLINIC | Age: 59
End: 2024-03-11
Payer: COMMERCIAL

## 2024-03-11 ENCOUNTER — OFFICE VISIT (OUTPATIENT)
Dept: OTOLARYNGOLOGY | Facility: CLINIC | Age: 59
End: 2024-03-11
Payer: COMMERCIAL

## 2024-03-11 VITALS — WEIGHT: 223 LBS | BODY MASS INDEX: 29.55 KG/M2 | HEIGHT: 73 IN | TEMPERATURE: 97.3 F

## 2024-03-11 DIAGNOSIS — B99.9 INFECTION: ICD-10-CM

## 2024-03-11 DIAGNOSIS — J03.91 RECURRENT TONSILLITIS: ICD-10-CM

## 2024-03-11 DIAGNOSIS — J36 PERITONSILLAR ABSCESS: Primary | ICD-10-CM

## 2024-03-11 PROCEDURE — 4004F PT TOBACCO SCREEN RCVD TLK: CPT | Performed by: OTOLARYNGOLOGY

## 2024-03-11 PROCEDURE — RXMED WILLOW AMBULATORY MEDICATION CHARGE

## 2024-03-11 PROCEDURE — 87070 CULTURE OTHR SPECIMN AEROBIC: CPT

## 2024-03-11 PROCEDURE — 99213 OFFICE O/P EST LOW 20 MIN: CPT | Performed by: OTOLARYNGOLOGY

## 2024-03-11 PROCEDURE — 87185 SC STD ENZYME DETCJ PER NZM: CPT

## 2024-03-11 PROCEDURE — 42700 I&D ABSCESS PERITONSILLAR: CPT | Performed by: OTOLARYNGOLOGY

## 2024-03-11 PROCEDURE — 87205 SMEAR GRAM STAIN: CPT

## 2024-03-11 PROCEDURE — 87075 CULTR BACTERIA EXCEPT BLOOD: CPT

## 2024-03-11 RX ORDER — AMOXICILLIN AND CLAVULANATE POTASSIUM 875; 125 MG/1; MG/1
1 TABLET, FILM COATED ORAL 2 TIMES DAILY
Qty: 20 TABLET | Refills: 0 | Status: SHIPPED | OUTPATIENT
Start: 2024-03-11 | End: 2024-03-21

## 2024-03-11 RX ORDER — PREDNISONE 5 MG/1
TABLET ORAL
Qty: 21 TABLET | Refills: 0 | Status: SHIPPED | OUTPATIENT
Start: 2024-03-11 | End: 2024-03-17

## 2024-03-11 NOTE — PROGRESS NOTES
MALLIKA Blas is a 58 y.o. male has done well since January until the last couple days with again left-sided throat pain.  He is breathing well.  Feels a little bit better since last night but he has not been able to sleep.    Prior history: with a history of Burkitt's lymphoma as a child.  He presented to the emergency room last night with considerable swelling and sore throat on the left-hand side.  The symptoms began yesterday morning.  He has a history of similar symptoms back in October for which she saw Dr. Kamara and had CAT scan.  Nothing concerning was seen on the CAT scan.  He had another CAT scan in the emergency room last night demonstrating a small abscess in the left peritonsillar area although it is reportedly in the lingual tonsil area on the radiologist report.  He had considerable swelling on the CT scan.  He was treated with IV antibiotics and steroids and had marked improvement.  He is feeling dramatically better today.  He is swallowing and breathing well now.  He did not really have any history of tonsil problems prior to October.  He does snore.  Sleep study is recommended for him by Dr. Kamara and he has been reluctant to have this done      Past Medical History:   Diagnosis Date    Cancer (CMS/[facility])     High cholesterol     History of Burkitt's lymphoma     Radiculopathy, cervical region     Cervical radiculopathy    Sinus infection             Medications:     Current Outpatient Medications:     cholecalciferol (Vitamin D-3) 5,000 Units tablet, Take 1 tablet (5,000 Units) by mouth once daily., Disp: , Rfl:     ibuprofen 600 mg tablet, TAKE 1 TABLET BY MOUTH FOUR TIMES A DAY AS NEEDED FOR MILD PAIN, Disp: 90 tablet, Rfl: 5    rosuvastatin calcium (CRESTOR ORAL), Take 20 mg by mouth once daily., Disp: , Rfl:     aspirin 81 mg EC tablet, Take 1 tablet (81 mg) by mouth once daily., Disp: , Rfl:     doxycycline (Vibramycin) 100 mg capsule, TAKE 1 CAPSULE BY MOUTH TWO TIMES A DAY FOR 10  "DAYS. TAKE WITH AT LEAST 8 OUNCES OF WATER, DO NOT LIE DOWN FOR 30 MINUTES AFTER (Patient not taking: Reported on 3/11/2024), Disp: 20 capsule, Rfl: 0    loratadine (Claritin) 10 mg tablet, TAKE 1 TABLET BY MOUTH ONCE DAILY (Patient not taking: Reported on 3/11/2024), Disp: 30 tablet, Rfl: 0    rosuvastatin (Crestor) 20 mg tablet, TAKE 1 TABLET DAILY., Disp: 90 tablet, Rfl: 1     Allergies:  Allergies   Allergen Reactions    Simvastatin Other     Elevated LFTs    Tramadol Hcl Unknown        Physical Exam:  Last Recorded Vitals  Temperature 36.3 °C (97.3 °F), height 1.854 m (6' 1\"), weight 101 kg (223 lb).  General:     General appearance: Well-developed, well-nourished in no acute distress.       Voice:  normal       Head/face: Normal appearance; nontender to palpation     Facial nerve function: Normal and symmetric bilaterally.    Oral/oropharynx:     Oral vestibule: Normal labial and gingival mucosa     Tongue/floor of mouth: Normal without lesion     Oropharynx: Clear.  No lesions present of the hard/soft palate, posterior pharynx 2+ tonsils bilaterally.  Erythema left soft palate with worsened asymmetry and bulging on the left.  Aspirated as below.    Neck:     Neck: Normal appearance, trachea midline     Salivary glands: Normal to palpation bilaterally     Lymph nodes: No cervical lymphadenopathy to palpation     Thyroid: No thyromegaly.  No palpable nodules     Range of motion: Normal    Neurological:     Cortical functions: Alert and oriented x3, appropriate affect       Larynx/hypopharynx:     Laryngeal findings: Mirror exam inadequate or limited secondary to enlarged base of tongue and/or excessive gagging.      Nasopharynx:     Nasopharynx: Normal    Ear:     Ear canal: Normal bilaterally     Tympanic membrane: Intact and mobile bilaterally     Pinna: Normal bilaterally     Hearing:  Gross hearing assessment normal by voice    Nose:     Visualized using: Anterior rhinoscopy     Nasopharynx: Inadequate " mirror exam secondary to gag, anatomy.       Nasal dorsum: Nontraumatic midline appearance     Septum: Midline     Inferior turbinates: Normally sized     Mucosa: Bilateral, pink, normal appearing       Assessment/Plan   Left tonsillitis with peritonsillar abscess.  With his verbal consent, aspirated and got about 5 cc of pus.  Cultured.  Incision and drainage performed with 15 blade.  Not much in the way of additional pus identified but cavity was fully explored.  Antibiotics and prednisone sent.  I will see him back in 2 weeks.  He has history may warrant tonsillectomy although he has not yet had drainage of this abscess and this may be hold over from January.  We will see how he does          Tyler Mcfadden MD

## 2024-03-14 LAB
B-LACTAMASE ORGANISM ISLT: NEGATIVE
BACTERIA SPEC CULT: NORMAL
BACTERIA SPEC CULT: NORMAL
GRAM STN SPEC: NORMAL
GRAM STN SPEC: NORMAL

## 2024-03-15 DIAGNOSIS — E78.2 MIXED HYPERLIPIDEMIA: Primary | ICD-10-CM

## 2024-03-15 PROCEDURE — RXMED WILLOW AMBULATORY MEDICATION CHARGE

## 2024-03-15 RX ORDER — ROSUVASTATIN CALCIUM 20 MG/1
20 TABLET, COATED ORAL DAILY
Qty: 90 TABLET | Refills: 0 | Status: SHIPPED | OUTPATIENT
Start: 2024-03-15 | End: 2024-06-16

## 2024-03-18 ENCOUNTER — PHARMACY VISIT (OUTPATIENT)
Dept: PHARMACY | Facility: CLINIC | Age: 59
End: 2024-03-18
Payer: COMMERCIAL

## 2024-03-27 ENCOUNTER — OFFICE VISIT (OUTPATIENT)
Dept: OTOLARYNGOLOGY | Facility: CLINIC | Age: 59
End: 2024-03-27
Payer: COMMERCIAL

## 2024-03-27 DIAGNOSIS — J35.1 TONSILLAR HYPERTROPHY: ICD-10-CM

## 2024-03-27 DIAGNOSIS — J36 PERITONSILLAR ABSCESS: Primary | ICD-10-CM

## 2024-03-27 PROCEDURE — 4004F PT TOBACCO SCREEN RCVD TLK: CPT | Performed by: OTOLARYNGOLOGY

## 2024-03-27 PROCEDURE — 99213 OFFICE O/P EST LOW 20 MIN: CPT | Performed by: OTOLARYNGOLOGY

## 2024-03-27 NOTE — PROGRESS NOTES
MALLIKA Blas is a 58 y.o. male status post incision and drainage left peritonsillar abscess 3/11/2024.  He has finished his antibiotics.  He is feeling much better.  No pain.  Swallowing well.      Prior history: with a history of Burkitt's lymphoma as a child.  He presented to the emergency room last night with considerable swelling and sore throat on the left-hand side.  The symptoms began yesterday morning.  He has a history of similar symptoms back in October for which she saw Dr. Kamara and had CAT scan.  Nothing concerning was seen on the CAT scan.  He had another CAT scan in the emergency room last night demonstrating a small abscess in the left peritonsillar area although it is reportedly in the lingual tonsil area on the radiologist report.  He had considerable swelling on the CT scan.  He was treated with IV antibiotics and steroids and had marked improvement.  He is feeling dramatically better today.  He is swallowing and breathing well now.  He did not really have any history of tonsil problems prior to October.  He does snore.  Sleep study is recommended for him by Dr. Kamara and he has been reluctant to have this done      Past Medical History:   Diagnosis Date    Cancer (CMS/[facility])     High cholesterol     History of Burkitt's lymphoma     Radiculopathy, cervical region     Cervical radiculopathy    Sinus infection             Medications:     Current Outpatient Medications:     aspirin 81 mg EC tablet, Take 1 tablet (81 mg) by mouth once daily., Disp: , Rfl:     cholecalciferol (Vitamin D-3) 5,000 Units tablet, Take 1 tablet (5,000 Units) by mouth once daily., Disp: , Rfl:     doxycycline (Vibramycin) 100 mg capsule, TAKE 1 CAPSULE BY MOUTH TWO TIMES A DAY FOR 10 DAYS. TAKE WITH AT LEAST 8 OUNCES OF WATER, DO NOT LIE DOWN FOR 30 MINUTES AFTER, Disp: 20 capsule, Rfl: 0    ibuprofen 600 mg tablet, TAKE 1 TABLET BY MOUTH FOUR TIMES A DAY AS NEEDED FOR MILD PAIN, Disp: 90 tablet, Rfl: 5     loratadine (Claritin) 10 mg tablet, TAKE 1 TABLET BY MOUTH ONCE DAILY, Disp: 30 tablet, Rfl: 0    rosuvastatin (Crestor) 20 mg tablet, TAKE 1 TABLET DAILY., Disp: 90 tablet, Rfl: 0    rosuvastatin calcium (CRESTOR ORAL), Take 20 mg by mouth once daily., Disp: , Rfl:      Allergies:  Allergies   Allergen Reactions    Simvastatin Other     Elevated LFTs    Tramadol Hcl Unknown        Physical Exam:  Last Recorded Vitals  There were no vitals taken for this visit.  General:     General appearance: Well-developed, well-nourished in no acute distress.       Voice:  normal       Head/face: Normal appearance; nontender to palpation     Facial nerve function: Normal and symmetric bilaterally.    Oral/oropharynx:     Oral vestibule: Normal labial and gingival mucosa     Tongue/floor of mouth: Normal without lesion     Oropharynx: Clear.  No lesions present of the hard/soft palate, posterior pharynx 2+ tonsils bilaterally.  No further erythema.  I&D site has healed    Neck:     Neck: Normal appearance, trachea midline     Salivary glands: Normal to palpation bilaterally     Lymph nodes: No cervical lymphadenopathy to palpation     Thyroid: No thyromegaly.  No palpable nodules     Range of motion: Normal    Neurological:     Cortical functions: Alert and oriented x3, appropriate affect       Larynx/hypopharynx:     Laryngeal findings: Mirror exam inadequate or limited secondary to enlarged base of tongue and/or excessive gagging.      Nasopharynx:     Nasopharynx: Normal    Ear:     Ear canal: Normal bilaterally     Tympanic membrane: Intact and mobile bilaterally     Pinna: Normal bilaterally     Hearing:  Gross hearing assessment normal by voice    Nose:     Visualized using: Anterior rhinoscopy     Nasopharynx: Inadequate mirror exam secondary to gag, anatomy.       Nasal dorsum: Nontraumatic midline appearance     Septum: Midline     Inferior turbinates: Normally sized     Mucosa: Bilateral, pink, normal appearing        Assessment/Plan   Left tonsillitis with peritonsillar abscess has now healed status post incision and drainage.  We discussed the role of tonsillectomy but he is doing well and I am hopeful that this has been definitive treatment and he will not require this.  We will watch and he will call with any recurrence of symptoms and if he has further problems, tonsillectomy will be likely recommended at that time         Tyler Mcfadden MD

## 2024-06-10 ENCOUNTER — PHARMACY VISIT (OUTPATIENT)
Dept: PHARMACY | Facility: CLINIC | Age: 59
End: 2024-06-10
Payer: COMMERCIAL

## 2024-06-10 ENCOUNTER — TELEPHONE (OUTPATIENT)
Dept: OTOLARYNGOLOGY | Facility: CLINIC | Age: 59
End: 2024-06-10
Payer: COMMERCIAL

## 2024-06-10 DIAGNOSIS — B99.9 INFECTION: Primary | ICD-10-CM

## 2024-06-10 PROCEDURE — RXMED WILLOW AMBULATORY MEDICATION CHARGE

## 2024-06-10 RX ORDER — AMOXICILLIN 875 MG/1
TABLET, FILM COATED ORAL
Qty: 20 TABLET | Refills: 0 | Status: SHIPPED | OUTPATIENT
Start: 2024-06-10

## 2024-06-10 NOTE — TELEPHONE ENCOUNTER
Pt c/o lump in back of throat again, Had PTA 3/27/24. He is at work, cannot sleep, facial swelling. Can Dr Mcfadden send in Abx and steroids. He is trying to keep up with fluids. 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

## 2024-06-11 ENCOUNTER — TELEPHONE (OUTPATIENT)
Dept: OTOLARYNGOLOGY | Facility: CLINIC | Age: 59
End: 2024-06-11
Payer: COMMERCIAL

## 2024-06-11 DIAGNOSIS — B99.9 INFECTION: Primary | ICD-10-CM

## 2024-06-11 PROCEDURE — RXMED WILLOW AMBULATORY MEDICATION CHARGE

## 2024-06-11 RX ORDER — PREDNISONE 5 MG/1
TABLET ORAL
Qty: 21 TABLET | Refills: 0 | Status: SHIPPED | OUTPATIENT
Start: 2024-06-11

## 2024-06-11 NOTE — TELEPHONE ENCOUNTER
Wife Ann called.  She said that she knows he just started the augmentin but he is in a lot of pain and not getting any better. Do you want to add steroids? Do you want to see him tomorrow?

## 2024-06-12 ENCOUNTER — PHARMACY VISIT (OUTPATIENT)
Dept: PHARMACY | Facility: CLINIC | Age: 59
End: 2024-06-12
Payer: COMMERCIAL

## 2024-06-12 ENCOUNTER — OFFICE VISIT (OUTPATIENT)
Dept: OTOLARYNGOLOGY | Facility: CLINIC | Age: 59
End: 2024-06-12
Payer: COMMERCIAL

## 2024-06-12 VITALS — TEMPERATURE: 97.5 F | WEIGHT: 218.4 LBS | HEIGHT: 73 IN | BODY MASS INDEX: 28.94 KG/M2

## 2024-06-12 DIAGNOSIS — J03.91 RECURRENT TONSILLITIS: ICD-10-CM

## 2024-06-12 DIAGNOSIS — J36 PERITONSILLAR ABSCESS: Primary | ICD-10-CM

## 2024-06-12 PROCEDURE — 42700 I&D ABSCESS PERITONSILLAR: CPT | Performed by: OTOLARYNGOLOGY

## 2024-06-12 PROCEDURE — 4004F PT TOBACCO SCREEN RCVD TLK: CPT | Performed by: OTOLARYNGOLOGY

## 2024-06-12 PROCEDURE — 99213 OFFICE O/P EST LOW 20 MIN: CPT | Performed by: OTOLARYNGOLOGY

## 2024-06-12 NOTE — PROGRESS NOTES
MALLIKA Blas is a 59 y.o. male status post incision and drainage left peritonsillar abscess 3/11/2024.  After this fully resolved he had been doing well until recently when he again developed sore throat and spontaneous drainage left oropharynx.  He is feeling better today with prednisone added to the antibiotics that was started a couple days ago.    Prior history: with a history of Burkitt's lymphoma as a child.  He presented to the emergency room last night with considerable swelling and sore throat on the left-hand side.  The symptoms began yesterday morning.  He has a history of similar symptoms back in October for which she saw Dr. Kamara and had CAT scan.  Nothing concerning was seen on the CAT scan.  He had another CAT scan in the emergency room last night demonstrating a small abscess in the left peritonsillar area although it is reportedly in the lingual tonsil area on the radiologist report.  He had considerable swelling on the CT scan.  He was treated with IV antibiotics and steroids and had marked improvement.  He is feeling dramatically better today.  He is swallowing and breathing well now.  He did not really have any history of tonsil problems prior to October.  He does snore.  Sleep study is recommended for him by Dr. Kamara and he has been reluctant to have this done      Past Medical History:   Diagnosis Date    Cancer (Multi)     High cholesterol     History of Burkitt's lymphoma     Radiculopathy, cervical region     Cervical radiculopathy    Sinus infection             Medications:     Current Outpatient Medications:     amoxicillin (Amoxil) 875 mg tablet, Take 1 tablet by mouth twice daily for 10 days., Disp: 20 tablet, Rfl: 0    aspirin 81 mg EC tablet, Take 1 tablet (81 mg) by mouth once daily., Disp: , Rfl:     ibuprofen 600 mg tablet, TAKE 1 TABLET BY MOUTH FOUR TIMES A DAY AS NEEDED FOR MILD PAIN, Disp: 90 tablet, Rfl: 5    predniSONE (Deltasone) 5 mg tablet, Take 6 tabs by  "mouth daily x 1 day, then 5 tabs daily x 1 day, then 4 tabs daily x 1 day, then 3 tabs daily x 1 day, then 2 tabs daily x 1 day, then 1 tab daily x 1 day, Disp: 21 tablet, Rfl: 0    rosuvastatin (Crestor) 20 mg tablet, TAKE 1 TABLET DAILY., Disp: 90 tablet, Rfl: 0    rosuvastatin calcium (CRESTOR ORAL), Take 20 mg by mouth once daily., Disp: , Rfl:     cholecalciferol (Vitamin D-3) 5,000 Units tablet, Take 1 tablet (5,000 Units) by mouth once daily., Disp: , Rfl:     doxycycline (Vibramycin) 100 mg capsule, TAKE 1 CAPSULE BY MOUTH TWO TIMES A DAY FOR 10 DAYS. TAKE WITH AT LEAST 8 OUNCES OF WATER, DO NOT LIE DOWN FOR 30 MINUTES AFTER (Patient not taking: Reported on 6/12/2024), Disp: 20 capsule, Rfl: 0    loratadine (Claritin) 10 mg tablet, TAKE 1 TABLET BY MOUTH ONCE DAILY (Patient not taking: Reported on 6/12/2024), Disp: 30 tablet, Rfl: 0     Allergies:  Allergies   Allergen Reactions    Simvastatin Other     Elevated LFTs    Tramadol Hcl Unknown        Physical Exam:  Last Recorded Vitals  Temperature 36.4 °C (97.5 °F), height 1.854 m (6' 1\"), weight 99.1 kg (218 lb 6.4 oz).  General:     General appearance: Well-developed, well-nourished in no acute distress.       Voice:  normal       Head/face: Normal appearance; nontender to palpation     Facial nerve function: Normal and symmetric bilaterally.    Oral/oropharynx:     Oral vestibule: Normal labial and gingival mucosa     Tongue/floor of mouth: Normal without lesion     Oropharynx: Clear.  No lesions present of the hard/soft palate, posterior pharynx 2+ tonsils bilaterally.  Left side erythematous with a small pinhole area of purulent drainage.  With his verbal informed consent, this was locally anesthetized and enlarged with an 11 blade.  There was not much purulence beyond that    Neck:     Neck: Normal appearance, trachea midline     Salivary glands: Normal to palpation bilaterally     Lymph nodes: No cervical lymphadenopathy to palpation     Thyroid: No " thyromegaly.  No palpable nodules     Range of motion: Normal    Neurological:     Cortical functions: Alert and oriented x3, appropriate affect       Larynx/hypopharynx:     Laryngeal findings: Mirror exam inadequate or limited secondary to enlarged base of tongue and/or excessive gagging.      Nasopharynx:     Nasopharynx: Normal    Ear:     Ear canal: Normal bilaterally     Tympanic membrane: Intact and mobile bilaterally     Pinna: Normal bilaterally     Hearing:  Gross hearing assessment normal by voice    Nose:     Visualized using: Anterior rhinoscopy     Nasopharynx: Inadequate mirror exam secondary to gag, anatomy.       Nasal dorsum: Nontraumatic midline appearance     Septum: Midline     Inferior turbinates: Normally sized     Mucosa: Bilateral, pink, normal appearing       Assessment/Plan   Left tonsillitis again with small peritonsillar abscess.  I&D was performed today.  He will finish his oral antibiotics and prednisone.  In light of the recurrent nature of this, we discussed tonsillectomy today. The risks, goals, and alternatives were discussed in detail including, but not limited to, general anesthesia, dehydration, bleeding, and infection.  Velopharyngeal insufficiency and regrowth of tissue potentially requiring revision procedure in the future were also reviewed.  Informed consent was indicated and the procedure will be scheduled.    Tyler Mcfadden MD

## 2024-07-25 DIAGNOSIS — E78.2 MIXED HYPERLIPIDEMIA: ICD-10-CM

## 2024-07-25 RX ORDER — ROSUVASTATIN CALCIUM 20 MG/1
20 TABLET, COATED ORAL DAILY
Qty: 90 TABLET | Refills: 0 | OUTPATIENT
Start: 2024-07-25 | End: 2024-10-23

## 2024-07-30 DIAGNOSIS — E78.2 MIXED HYPERLIPIDEMIA: ICD-10-CM

## 2024-07-30 RX ORDER — ROSUVASTATIN CALCIUM 20 MG/1
20 TABLET, COATED ORAL DAILY
Qty: 90 TABLET | Refills: 0 | Status: SHIPPED | OUTPATIENT
Start: 2024-07-30

## 2024-07-31 ENCOUNTER — PHARMACY VISIT (OUTPATIENT)
Dept: PHARMACY | Facility: CLINIC | Age: 59
End: 2024-07-31
Payer: COMMERCIAL

## 2024-07-31 PROCEDURE — RXMED WILLOW AMBULATORY MEDICATION CHARGE

## 2024-07-31 PROCEDURE — RXOTC WILLOW AMBULATORY OTC CHARGE

## 2024-08-09 ENCOUNTER — TELEPHONE (OUTPATIENT)
Dept: CARDIOLOGY | Facility: HOSPITAL | Age: 59
End: 2024-08-09
Payer: COMMERCIAL

## 2024-08-26 ENCOUNTER — APPOINTMENT (OUTPATIENT)
Dept: CARDIOLOGY | Facility: CLINIC | Age: 59
End: 2024-08-26
Payer: COMMERCIAL

## 2024-08-28 ENCOUNTER — APPOINTMENT (OUTPATIENT)
Dept: CARDIOLOGY | Facility: CLINIC | Age: 59
End: 2024-08-28
Payer: COMMERCIAL

## 2024-08-29 ENCOUNTER — OFFICE VISIT (OUTPATIENT)
Dept: CARDIOLOGY | Facility: CLINIC | Age: 59
End: 2024-08-29
Payer: COMMERCIAL

## 2024-08-29 ENCOUNTER — LAB (OUTPATIENT)
Dept: LAB | Facility: LAB | Age: 59
End: 2024-08-29
Payer: COMMERCIAL

## 2024-08-29 VITALS
HEIGHT: 73 IN | WEIGHT: 222.3 LBS | SYSTOLIC BLOOD PRESSURE: 125 MMHG | HEART RATE: 78 BPM | DIASTOLIC BLOOD PRESSURE: 80 MMHG | OXYGEN SATURATION: 95 % | BODY MASS INDEX: 29.46 KG/M2

## 2024-08-29 DIAGNOSIS — I51.9 RADIATION-INDUCED HEART DISEASE: ICD-10-CM

## 2024-08-29 DIAGNOSIS — R73.01 IMPAIRED FASTING BLOOD SUGAR: ICD-10-CM

## 2024-08-29 DIAGNOSIS — E78.01 FAMILIAL HYPERCHOLESTEROLEMIA: ICD-10-CM

## 2024-08-29 DIAGNOSIS — I35.0 NONRHEUMATIC AORTIC VALVE STENOSIS: ICD-10-CM

## 2024-08-29 DIAGNOSIS — I25.10 CORONARY ARTERIOSCLEROSIS: ICD-10-CM

## 2024-08-29 DIAGNOSIS — I25.10 CORONARY ARTERIOSCLEROSIS: Primary | ICD-10-CM

## 2024-08-29 DIAGNOSIS — R94.31 ABNORMAL EKG: ICD-10-CM

## 2024-08-29 DIAGNOSIS — E66.3 OVERWEIGHT (BMI 25.0-29.9): ICD-10-CM

## 2024-08-29 DIAGNOSIS — R06.09 DOE (DYSPNEA ON EXERTION): ICD-10-CM

## 2024-08-29 LAB
ALBUMIN SERPL BCP-MCNC: 4.8 G/DL (ref 3.4–5)
ALP SERPL-CCNC: 57 U/L (ref 33–120)
ALT SERPL W P-5'-P-CCNC: 34 U/L (ref 10–52)
ANION GAP SERPL CALC-SCNC: 16 MMOL/L (ref 10–20)
AST SERPL W P-5'-P-CCNC: 22 U/L (ref 9–39)
BILIRUB SERPL-MCNC: 0.5 MG/DL (ref 0–1.2)
BUN SERPL-MCNC: 15 MG/DL (ref 6–23)
CALCIUM SERPL-MCNC: 10.4 MG/DL (ref 8.6–10.6)
CHLORIDE SERPL-SCNC: 101 MMOL/L (ref 98–107)
CHOLEST SERPL-MCNC: 183 MG/DL (ref 0–199)
CHOLESTEROL/HDL RATIO: 3.5
CO2 SERPL-SCNC: 26 MMOL/L (ref 21–32)
CREAT SERPL-MCNC: 0.84 MG/DL (ref 0.5–1.3)
EGFRCR SERPLBLD CKD-EPI 2021: >90 ML/MIN/1.73M*2
EST. AVERAGE GLUCOSE BLD GHB EST-MCNC: 114 MG/DL
GLUCOSE SERPL-MCNC: 96 MG/DL (ref 74–99)
HBA1C MFR BLD: 5.6 %
HDLC SERPL-MCNC: 52 MG/DL
LDLC SERPL CALC-MCNC: 110 MG/DL
NON HDL CHOLESTEROL: 131 MG/DL (ref 0–149)
POTASSIUM SERPL-SCNC: 5.2 MMOL/L (ref 3.5–5.3)
PROT SERPL-MCNC: 7.5 G/DL (ref 6.4–8.2)
SODIUM SERPL-SCNC: 138 MMOL/L (ref 136–145)
TRIGL SERPL-MCNC: 104 MG/DL (ref 0–149)
TSH SERPL-ACNC: 1.36 MIU/L (ref 0.44–3.98)
VLDL: 21 MG/DL (ref 0–40)

## 2024-08-29 PROCEDURE — 93010 ELECTROCARDIOGRAM REPORT: CPT | Performed by: INTERNAL MEDICINE

## 2024-08-29 PROCEDURE — 80061 LIPID PANEL: CPT

## 2024-08-29 PROCEDURE — 80053 COMPREHEN METABOLIC PANEL: CPT

## 2024-08-29 PROCEDURE — 99204 OFFICE O/P NEW MOD 45 MIN: CPT | Performed by: INTERNAL MEDICINE

## 2024-08-29 PROCEDURE — 84443 ASSAY THYROID STIM HORMONE: CPT

## 2024-08-29 PROCEDURE — 3008F BODY MASS INDEX DOCD: CPT | Performed by: INTERNAL MEDICINE

## 2024-08-29 PROCEDURE — 93005 ELECTROCARDIOGRAM TRACING: CPT | Performed by: INTERNAL MEDICINE

## 2024-08-29 PROCEDURE — 82172 ASSAY OF APOLIPOPROTEIN: CPT

## 2024-08-29 PROCEDURE — 4004F PT TOBACCO SCREEN RCVD TLK: CPT | Performed by: INTERNAL MEDICINE

## 2024-08-29 PROCEDURE — 36415 COLL VENOUS BLD VENIPUNCTURE: CPT

## 2024-08-29 PROCEDURE — 83036 HEMOGLOBIN GLYCOSYLATED A1C: CPT

## 2024-08-29 PROCEDURE — 99214 OFFICE O/P EST MOD 30 MIN: CPT | Mod: 25 | Performed by: INTERNAL MEDICINE

## 2024-08-29 ASSESSMENT — PAIN SCALES - GENERAL: PAINLEVEL: 0-NO PAIN

## 2024-08-29 ASSESSMENT — ENCOUNTER SYMPTOMS
PALPITATIONS: 0
OCCASIONAL FEELINGS OF UNSTEADINESS: 0
SYNCOPE: 0
DEPRESSION: 0
LOSS OF SENSATION IN FEET: 1
PND: 0
NEAR-SYNCOPE: 0
DYSPNEA ON EXERTION: 1

## 2024-08-29 ASSESSMENT — COLUMBIA-SUICIDE SEVERITY RATING SCALE - C-SSRS
1. IN THE PAST MONTH, HAVE YOU WISHED YOU WERE DEAD OR WISHED YOU COULD GO TO SLEEP AND NOT WAKE UP?: NO
2. HAVE YOU ACTUALLY HAD ANY THOUGHTS OF KILLING YOURSELF?: NO
6. HAVE YOU EVER DONE ANYTHING, STARTED TO DO ANYTHING, OR PREPARED TO DO ANYTHING TO END YOUR LIFE?: NO

## 2024-08-29 NOTE — PROGRESS NOTES
Primary Care Physician: Gwen Collins MD  Date of Visit: 08/29/2024  8:20 AM EDT  Location of visit: [facility] [address]   Last office visit: None    Chief Complaint:     Cardiovascular risk.  Preoperative risk assessment.    HPI/Summary  Gopal Blas is a 59 y.o. male who presents for  cardiology evaluation.  We had seen him once before, in 2017.    Patient presents for cardiovascular risk assessment.  There is a history of Burkitt's lymphoma as a child, status post chest radiation. Age 9.  A coronary calcium score performed in January, 2023 was 210, with calcification primarily in the left main and LAD.  An echocardiogram on March 2, 2023 showed normal LV function.  There was moderate aortic stenosis.  There was also mild to moderate aortic regurgitation.  Peak velocity 2.94 m/s, peak gradient 35, mean gradient 19, aortic valve area 0.97 to 1.0 cm², dimensionless index 0.43.    Current symptoms: Patient reports dyspnea with more than usual daily activity.  No PND, orthopnea, chest pain, lightheadedness or syncope.  Short of breath if he works out in the yard, or if he walks briskly.  Not short of breath with daily activities or walking on the job.    Past medical history: Burkitt's lymphoma as noted.  Multiple spine surgeries, 2 cervical and 2 lumbar.  Hypercholesterolemia, on rosuvastatin.    Family history: Brother had CABG age 50.    Social history:  at a local [employer].  Smoker, 1.5 packs/day for at least 35 years, now down to 1 pack/day.  .  No children.  Lives in [facility].  Specialty Problems          Cardiology Problems    Abnormal ECG    Current every day smoker    HLD (hyperlipidemia)    Nonrheumatic aortic valve stenosis    Systolic murmur    Coronary arteriosclerosis    OLIVIA (dyspnea on exertion)    Elevated blood pressure reading    Radiation-induced heart disease        Past Medical History:   Diagnosis Date    Cancer (Multi)     High cholesterol     History of  "Burkitt's lymphoma     Radiculopathy, cervical region     Cervical radiculopathy    Sinus infection           Past Surgical History:   Procedure Laterality Date    CARPAL TUNNEL RELEASE  06/23/2016    Neuroplasty Decompression Median Nerve At Carpal Tunnel    OTHER SURGICAL HISTORY  08/09/2014    Arthrodesis Cervical    OTHER SURGICAL HISTORY  09/24/2014    Arthrodesis Cervical    OTHER SURGICAL HISTORY  09/24/2014    Post Spinal Diskectomy, Osteophytectomy One Lumbar Interspac    OTHER SURGICAL HISTORY  06/23/2016    Hemilaminectomy With Disc Removal Lumbar Re-Exploration    OTHER SURGICAL HISTORY  07/07/2018    Excision Of Thymus            Social History     Tobacco Use    Smoking status: Every Day     Current packs/day: 1.00     Types: Cigarettes    Smokeless tobacco: Never   Substance Use Topics    Alcohol use: Yes    Drug use: Never                 Allergies   Allergen Reactions    Simvastatin Other     Elevated LFTs    Tramadol Hcl Unknown         Current Outpatient Medications   Medication Instructions    rosuvastatin (Crestor) 20 mg tablet TAKE 1 TABLET DAILY.       Review of Systems   Cardiovascular:  Positive for dyspnea on exertion. Negative for chest pain, near-syncope, palpitations, paroxysmal nocturnal dyspnea and syncope.   All other systems reviewed and are negative.       Vital Signs:  Vitals:    08/29/24 0826 08/29/24 0831   BP: 131/78 125/80   BP Location: Left arm Right arm   Patient Position: Sitting Sitting   BP Cuff Size: Large adult Large adult   Pulse: 78    SpO2: 95%    Weight: 101 kg (222 lb 4.8 oz)    Height: 1.854 m (6' 1\")      Wt Readings from Last 2 Encounters:   08/29/24 101 kg (222 lb 4.8 oz)   06/12/24 99.1 kg (218 lb 6.4 oz)     Body mass index is 29.33 kg/m².       Physical Exam:    Pleasant man, oriented GEN alert.  Neck veins not apparent.  No palpable thrill.  Clear lung fields.  Regular heart sounds.  Grade 3 ejection systolic murmur loudest second right intercostal space " rating to suprasternal notch.  S2 diminished but present.  Grade 2 diastolic decrescendo murmur along left sternal border.  No S3.  Abdomen mildly obese.  No peripheral edema, and no signs of PAD.     Last Labs:  CMP:  Recent Labs     01/29/24  0004 10/05/23  1224 09/26/23  1055 02/17/22  1145 09/11/20  1155    139 138 138 139   K 4.1 4.8 5.4* 4.6 4.6    104 102 102 103   CO2 24 25 29 26 31   ANIONGAP 11 15 12 15 10   BUN 16 15 17 15 16   CREATININE 1.00 0.89 0.93 0.97 0.97   EGFR 88 >90  --   --   --    GLUCOSE 130* 86 97 86 89     Recent Labs     01/29/24  0004 10/05/23  1224 09/26/23  1055 02/17/22  1145 01/22/21  0852   ALBUMIN 4.1 4.8 5.0 4.8 4.7   ALKPHOS 80 53 63 61 55   ALT 19 38 33 68* 38   AST 18 22 18 31 21   BILITOT <0.2 0.6 0.5 0.4 0.5     CBC:  Recent Labs     01/29/24  0004 09/26/23  1055 02/17/22  1145 09/11/20  1155 07/03/18  1141   WBC 11.4* 8.9 8.8 7.9 7.3   HGB 15.1 17.0 16.5 15.3 15.9   HCT 45.0 52.3* 48.1 46.6 45.6    293 283 267 278   MCV 85 87 87 88 85     COAG:   Recent Labs     02/26/18  1240   INR 1.1     HEME/ENDO:  Recent Labs     02/17/22  1145 09/11/20  1155 07/03/18  1141   TSH 1.98 1.27 1.31      CARDIAC:   Recent Labs     09/26/23  1055        Recent Labs     02/17/22  1145 01/22/21  0852 09/11/20  1155   CHOL 241* 203* 224*   LDLF 137* 134* 152*   HDL 52.9 44.3 46.0   TRIG 257* 123 132       Last Cardiology Tests:    ECG:    Normal sinus rhythm with nonspecific T wave abnormality.  T wave inversion 1 and aVL.    Echo:  Echo Results:  No results found for this or any previous visit from the past 3650 days.       Cath:      Stress Test:  Stress Results:  No results found for this or any previous visit from the past 365 days.         Cardiac Imaging:        Assessment/Plan     The patient very likely has radiation-induced heart disease.  He also has what appears to be familial hypercholesterolemia.  We note an LDL cholesterol of 246 in 2020.  Examination  consistent with aortic stenosis and aortic insufficiency, and will be characterized further with an echocardiogram.  We will check routine laboratory tests.  He is very likely a candidate for a PCSK9 inhibitor, probably also up titration of rosuvastatin and the addition of ezetimibe to achieve an LDL cholesterol of less than 70 mg/dL.  We will proceed with coronary CTA to evaluate known elevated coronary calcium score, and abnormal resting ECG, marked hypercholesterolemia, and significant dyspnea with more than usual daily activity.  Patient very likely has obstructive coronary atherosclerosis.  We will defer consideration of elective tonsillectomy until these tests have been completed.  He will schedule the echocardiogram first, then the coronary CTA, and we will review laboratory tests before deciding on the timing of an in person visit.      Orders:  Orders Placed This Encounter   Procedures    CT angio coronary art with heartflow if score >30%    Comprehensive Metabolic Panel    Thyroid Stimulating Hormone    Lipid Panel    Hemoglobin A1C    Lipoprotein a    ECG 12 lead (Clinic Performed)    Transthoracic Echo (TTE) Complete      Followup Appts:  Future Appointments   Date Time Provider Department Center   9/20/2024  2:00 PM Tyler Mcfadden MD [id number]           ____________________________________________________________  Cal Potter MD    Senior Attending Physician  [facility]  [facility]    [facility] for [facility]ovascular Excellence  [employer]

## 2024-08-29 NOTE — PATIENT INSTRUCTIONS
1.  Obtain blood tests today.  We will call you with the results.  Additional medical therapy to lower cholesterol will very likely be suggested.    2.  Schedule echocardiogram.    3.  Schedule coronary CT angiogram to evaluate for any coronary artery disease.  This can be scheduled through radiology at [phone].    4.  Hold off on scheduling the tonsillectomy for now.  Further recommendations will follow.

## 2024-08-30 ENCOUNTER — APPOINTMENT (OUTPATIENT)
Dept: OTOLARYNGOLOGY | Facility: CLINIC | Age: 59
End: 2024-08-30
Payer: COMMERCIAL

## 2024-08-31 LAB
ATRIAL RATE: 76 BPM
LPA SERPL-MCNC: 79 MG/DL
P AXIS: 74 DEGREES
P OFFSET: 199 MS
P ONSET: 151 MS
PR INTERVAL: 128 MS
Q ONSET: 215 MS
QRS COUNT: 12 BEATS
QRS DURATION: 88 MS
QT INTERVAL: 378 MS
QTC CALCULATION(BAZETT): 425 MS
QTC FREDERICIA: 408 MS
R AXIS: 23 DEGREES
T AXIS: 105 DEGREES
T OFFSET: 404 MS
VENTRICULAR RATE: 76 BPM

## 2024-09-19 ENCOUNTER — HOSPITAL ENCOUNTER (OUTPATIENT)
Dept: CARDIOLOGY | Facility: CLINIC | Age: 59
Discharge: HOME | End: 2024-09-19
Payer: COMMERCIAL

## 2024-09-19 DIAGNOSIS — I35.0 NONRHEUMATIC AORTIC VALVE STENOSIS: ICD-10-CM

## 2024-09-19 DIAGNOSIS — I51.9 RADIATION-INDUCED HEART DISEASE: ICD-10-CM

## 2024-09-19 PROCEDURE — 93306 TTE W/DOPPLER COMPLETE: CPT | Performed by: INTERNAL MEDICINE

## 2024-09-19 PROCEDURE — 2500000004 HC RX 250 GENERAL PHARMACY W/ HCPCS (ALT 636 FOR OP/ED): Performed by: INTERNAL MEDICINE

## 2024-09-19 PROCEDURE — 93306 TTE W/DOPPLER COMPLETE: CPT

## 2024-09-20 ENCOUNTER — APPOINTMENT (OUTPATIENT)
Dept: OTOLARYNGOLOGY | Facility: CLINIC | Age: 59
End: 2024-09-20
Payer: COMMERCIAL

## 2024-09-22 LAB
AORTIC VALVE MEAN GRADIENT: 21.8 MMHG
AORTIC VALVE PEAK VELOCITY: 2.81 M/S
AV PEAK GRADIENT: 31.6 MMHG
AVA (PEAK VEL): 0.95 CM2
AVA (VTI): 0.98 CM2
EJECTION FRACTION APICAL 4 CHAMBER: 38.7
EJECTION FRACTION: 63 %
LEFT ATRIUM VOLUME AREA LENGTH INDEX BSA: 20.1 ML/M2
LEFT VENTRICLE INTERNAL DIMENSION DIASTOLE: 4.74 CM (ref 3.5–6)
LEFT VENTRICULAR OUTFLOW TRACT DIAMETER: 1.8 CM
MITRAL VALVE E/A RATIO: 0.9
TRICUSPID ANNULAR PLANE SYSTOLIC EXCURSION: 1.7 CM

## 2024-09-23 DIAGNOSIS — E78.2 MIXED HYPERLIPIDEMIA: ICD-10-CM

## 2024-09-23 RX ORDER — ROSUVASTATIN CALCIUM 20 MG/1
40 TABLET, COATED ORAL DAILY
Qty: 90 TABLET | Refills: 0 | Status: SHIPPED | OUTPATIENT
Start: 2024-09-23

## 2024-09-24 ENCOUNTER — HOSPITAL ENCOUNTER (OUTPATIENT)
Dept: RADIOLOGY | Facility: HOSPITAL | Age: 59
Discharge: HOME | End: 2024-09-24
Payer: COMMERCIAL

## 2024-09-24 VITALS
HEIGHT: 72 IN | DIASTOLIC BLOOD PRESSURE: 72 MMHG | RESPIRATION RATE: 18 BRPM | BODY MASS INDEX: 29.8 KG/M2 | HEART RATE: 71 BPM | SYSTOLIC BLOOD PRESSURE: 128 MMHG | OXYGEN SATURATION: 98 % | WEIGHT: 220 LBS

## 2024-09-24 DIAGNOSIS — I25.10 CORONARY ARTERIOSCLEROSIS: ICD-10-CM

## 2024-09-24 DIAGNOSIS — I35.0 NONRHEUMATIC AORTIC VALVE STENOSIS: ICD-10-CM

## 2024-09-24 DIAGNOSIS — I51.9 RADIATION-INDUCED HEART DISEASE: ICD-10-CM

## 2024-09-24 DIAGNOSIS — R93.1 ABNORMAL FINDINGS ON DIAGNOSTIC IMAGING OF HEART AND CORONARY CIRCULATION: ICD-10-CM

## 2024-09-24 DIAGNOSIS — R73.01 IMPAIRED FASTING BLOOD SUGAR: ICD-10-CM

## 2024-09-24 DIAGNOSIS — E78.01 FAMILIAL HYPERCHOLESTEROLEMIA: ICD-10-CM

## 2024-09-24 PROCEDURE — 75574 CT ANGIO HRT W/3D IMAGE: CPT

## 2024-09-24 PROCEDURE — 75580 N-INVAS EST C FFR SW ALY CTA: CPT

## 2024-09-24 PROCEDURE — 2500000005 HC RX 250 GENERAL PHARMACY W/O HCPCS: Performed by: RADIOLOGY

## 2024-09-24 PROCEDURE — 2550000001 HC RX 255 CONTRASTS: Performed by: INTERNAL MEDICINE

## 2024-09-24 PROCEDURE — 2500000001 HC RX 250 WO HCPCS SELF ADMINISTERED DRUGS (ALT 637 FOR MEDICARE OP): Performed by: RADIOLOGY

## 2024-09-24 RX ORDER — METOPROLOL TARTRATE 1 MG/ML
5 INJECTION, SOLUTION INTRAVENOUS ONCE AS NEEDED
Status: COMPLETED | OUTPATIENT
Start: 2024-09-24 | End: 2024-09-24

## 2024-09-24 RX ORDER — METOPROLOL TARTRATE 1 MG/ML
5 INJECTION, SOLUTION INTRAVENOUS ONCE
Status: COMPLETED | OUTPATIENT
Start: 2024-09-24 | End: 2024-09-24

## 2024-09-24 RX ORDER — METOPROLOL TARTRATE 50 MG/1
100 TABLET ORAL ONCE
Status: DISCONTINUED | OUTPATIENT
Start: 2024-09-24 | End: 2024-09-25 | Stop reason: HOSPADM

## 2024-09-24 RX ORDER — METOPROLOL TARTRATE 50 MG/1
100 TABLET ORAL ONCE AS NEEDED
Status: DISCONTINUED | OUTPATIENT
Start: 2024-09-24 | End: 2024-09-25 | Stop reason: HOSPADM

## 2024-09-24 RX ORDER — NITROGLYCERIN 0.4 MG/1
0.8 TABLET SUBLINGUAL ONCE
Status: COMPLETED | OUTPATIENT
Start: 2024-09-24 | End: 2024-09-24

## 2024-09-24 RX ORDER — LORAZEPAM 2 MG/ML
0.5 INJECTION INTRAMUSCULAR EVERY 5 MIN PRN
Status: DISCONTINUED | OUTPATIENT
Start: 2024-09-24 | End: 2024-09-25 | Stop reason: HOSPADM

## 2024-09-24 ASSESSMENT — PAIN SCALES - GENERAL: PAINLEVEL_OUTOF10: 6

## 2024-09-24 ASSESSMENT — PAIN - FUNCTIONAL ASSESSMENT: PAIN_FUNCTIONAL_ASSESSMENT: 0-10

## 2024-09-25 NOTE — RESULT ENCOUNTER NOTE
Reviewed coronary CTA with patient.  Moderate LAD disease and moderate circumflex disease noted on CTA, but FFR positive and proximal to mid LAD and also in proximal circumflex.  Patient has exertional dyspnea.  Note prior chest irradiation, and there is calcification in the pericardium.  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  He will need heart catheterization, with careful hemodynamics to exclude constriction.  We have increased rosuvastatin.  Told patient to begin low-dose aspirin.  He will schedule a follow-up visit to optimize medications, review the catheterization procedure in detail.  Will be scheduled at [facility].  Currently, not symptomatic with usual daily activities but dyspneic with more vigorous activity.

## 2024-10-15 ENCOUNTER — PHARMACY VISIT (OUTPATIENT)
Dept: PHARMACY | Facility: CLINIC | Age: 59
End: 2024-10-15
Payer: COMMERCIAL

## 2024-10-15 DIAGNOSIS — E78.2 MIXED HYPERLIPIDEMIA: ICD-10-CM

## 2024-10-15 PROCEDURE — RXMED WILLOW AMBULATORY MEDICATION CHARGE

## 2024-10-15 RX ORDER — ROSUVASTATIN CALCIUM 20 MG/1
40 TABLET, COATED ORAL DAILY
Qty: 90 TABLET | Refills: 0 | Status: SHIPPED | OUTPATIENT
Start: 2024-10-15

## 2024-10-18 ENCOUNTER — OFFICE VISIT (OUTPATIENT)
Dept: PRIMARY CARE | Facility: CLINIC | Age: 59
End: 2024-10-18
Payer: COMMERCIAL

## 2024-10-18 VITALS
HEIGHT: 72 IN | DIASTOLIC BLOOD PRESSURE: 76 MMHG | SYSTOLIC BLOOD PRESSURE: 134 MMHG | WEIGHT: 223 LBS | BODY MASS INDEX: 30.2 KG/M2

## 2024-10-18 DIAGNOSIS — M25.552 BILATERAL HIP PAIN: ICD-10-CM

## 2024-10-18 DIAGNOSIS — M87.00 AVASCULAR NECROSIS (MULTI): ICD-10-CM

## 2024-10-18 DIAGNOSIS — S33.9XXD SPRAIN OF LIGAMENT OF LUMBOSACRAL JOINT, SUBSEQUENT ENCOUNTER: ICD-10-CM

## 2024-10-18 DIAGNOSIS — M25.551 BILATERAL HIP PAIN: ICD-10-CM

## 2024-10-18 DIAGNOSIS — M46.1 SACROILIITIS (CMS-HCC): Primary | ICD-10-CM

## 2024-10-18 DIAGNOSIS — E78.5 HYPERLIPIDEMIA, UNSPECIFIED HYPERLIPIDEMIA TYPE: ICD-10-CM

## 2024-10-18 DIAGNOSIS — M25.50 ARTHRALGIA, UNSPECIFIED JOINT: ICD-10-CM

## 2024-10-18 PROCEDURE — 99214 OFFICE O/P EST MOD 30 MIN: CPT | Performed by: INTERNAL MEDICINE

## 2024-10-18 PROCEDURE — 3008F BODY MASS INDEX DOCD: CPT | Performed by: INTERNAL MEDICINE

## 2024-10-18 RX ORDER — PREDNISONE 5 MG/1
TABLET ORAL
Qty: 30 TABLET | Refills: 0 | Status: SHIPPED | OUTPATIENT
Start: 2024-10-18 | End: 2024-11-07

## 2024-10-18 RX ORDER — NABUMETONE 750 MG/1
750 TABLET, FILM COATED ORAL 2 TIMES DAILY
Qty: 60 TABLET | Refills: 0 | Status: SHIPPED | OUTPATIENT
Start: 2024-10-18 | End: 2025-10-18

## 2024-10-18 RX ORDER — CYCLOBENZAPRINE HCL 10 MG
10 TABLET ORAL 3 TIMES DAILY PRN
Qty: 60 TABLET | Refills: 0 | Status: SHIPPED | OUTPATIENT
Start: 2024-10-18 | End: 2024-12-17

## 2024-10-19 PROCEDURE — RXMED WILLOW AMBULATORY MEDICATION CHARGE

## 2024-10-19 NOTE — PROGRESS NOTES
Subjective   Patient ID: Gopal Blas is a 59 y.o. male who presents for Follow-up (Various conditions).    1. This 59-year-old young man today came here for excruciating pain in left hip and back sacroiliac joint going for last several days.  He does not recall fall, trauma, injury.  2. He informed me he had a radiation and probably cancer as a child.  He has got some radiation-induced  coronary problem.  He is seeing specialist for it and heart cath is planned, I agree.  He came for follow-up on various conditions.    CURRENT MEDICATIONS: He is only taking baby aspirin.    I have personally reviewed the patient's Past Medical History, Medications, Allergies, Social History, and Family History in the EMR.    Review of Systems   All other systems reviewed and are negative.    Objective   /76   Ht 1.829 m (6')   Wt 101 kg (223 lb)   BMI 30.24 kg/m²     Physical Exam  Vitals reviewed.   Cardiovascular:      Heart sounds: Normal heart sounds, S1 normal and S2 normal. No murmur heard.     No friction rub.   Pulmonary:      Effort: Pulmonary effort is normal.      Breath sounds: Normal breath sounds and air entry.   Abdominal:      Palpations: There is no hepatomegaly, splenomegaly or mass.   Musculoskeletal:      Right lower leg: No edema.      Left lower leg: No edema.      Comments: Left sacroiliac joint tender.  I examined both hips from inside.  I do not think it is avascular necrosis.  Minimal tenderness.  Movements okay, but painful.    Left exam is slightly shorter than right leg, I showed it to the patient.   Lymphadenopathy:      Lower Body: No right inguinal adenopathy. No left inguinal adenopathy.   Neurological:      Cranial Nerves: Cranial nerves 2-12 are intact.      Sensory: No sensory deficit.      Motor: Motor function is intact.      Deep Tendon Reflexes: Reflexes are normal and symmetric.     LAB WORK: Laboratory testing discussed.    Assessment/Plan   Problem List Items Addressed This  Visit             ICD-10-CM       Cardiac and Vasculature    HLD (hyperlipidemia) E78.5    Relevant Orders    CK    Arthritis Panel (CMS)    CBC    Comprehensive Metabolic Panel    Lipid Panel    Thyroid Stimulating Hormone    Urinalysis with Reflex Culture and Microscopic    XR hips bilateral 3 or 4 VW w pelvis when performed     Other Visit Diagnoses         Codes    Sacroiliitis (CMS-[facility])    -  Primary M46.1    Bilateral hip pain     M25.551, M25.552    Relevant Medications    nabumetone (Relafen) 750 mg tablet    cyclobenzaprine (Flexeril) 10 mg tablet    predniSONE (Deltasone) 5 mg tablet    Other Relevant Orders    CK    Arthritis Panel (CMS)    CBC    Comprehensive Metabolic Panel    Lipid Panel    Thyroid Stimulating Hormone    Urinalysis with Reflex Culture and Microscopic    XR hips bilateral 3 or 4 VW w pelvis when performed    Arthralgia, unspecified joint     M25.50    Relevant Medications    predniSONE (Deltasone) 5 mg tablet    Other Relevant Orders    CK    Arthritis Panel (CMS)    CBC    Comprehensive Metabolic Panel    Lipid Panel    Thyroid Stimulating Hormone    Urinalysis with Reflex Culture and Microscopic    XR hips bilateral 3 or 4 VW w pelvis when performed    Sprain of ligament of lumbosacral joint, subsequent encounter     S33.9XXD    Avascular necrosis (Multi)     M87.00        1. Sacroiliitis.  Ice, heat, Voltaren gel, Relafen, Flexeril.  2. Lumbosacral sprain.  Little steroid to keep him functional.  X-rays both ordered.  3. Possibility of avascular necrosis, I really doubt it, but I ordered x-ray because of his history.  4. Complete blood work ordered.  X-rays ordered.  5. I shall see him in two to three week’s time.  Self-directed physical therapy, if necessary he will go to the physical therapy upstairs, but he is busy.  I am happy to see him anytime sooner if necessary.    Scribe Attestation  By signing my name below, I, Femi Ledbetter attest that this documentation has been  prepared under the direction and in the presence of Gwen Collins MD.

## 2024-10-21 ENCOUNTER — PHARMACY VISIT (OUTPATIENT)
Dept: PHARMACY | Facility: CLINIC | Age: 59
End: 2024-10-21
Payer: COMMERCIAL

## 2024-10-21 ENCOUNTER — APPOINTMENT (OUTPATIENT)
Dept: CARDIOLOGY | Facility: CLINIC | Age: 59
End: 2024-10-21
Payer: COMMERCIAL

## 2024-10-28 ENCOUNTER — OFFICE VISIT (OUTPATIENT)
Dept: CARDIOLOGY | Facility: CLINIC | Age: 59
End: 2024-10-28
Payer: COMMERCIAL

## 2024-10-28 ENCOUNTER — TELEPHONE (OUTPATIENT)
Dept: CARDIOLOGY | Facility: CLINIC | Age: 59
End: 2024-10-28

## 2024-10-28 VITALS
WEIGHT: 225.3 LBS | SYSTOLIC BLOOD PRESSURE: 116 MMHG | OXYGEN SATURATION: 95 % | DIASTOLIC BLOOD PRESSURE: 78 MMHG | HEART RATE: 78 BPM | BODY MASS INDEX: 29.86 KG/M2 | HEIGHT: 73 IN

## 2024-10-28 DIAGNOSIS — I35.0 NONRHEUMATIC AORTIC VALVE STENOSIS: ICD-10-CM

## 2024-10-28 DIAGNOSIS — I51.9 RADIATION-INDUCED HEART DISEASE: ICD-10-CM

## 2024-10-28 DIAGNOSIS — I25.10 CORONARY ARTERY DISEASE, UNSPECIFIED VESSEL OR LESION TYPE, UNSPECIFIED WHETHER ANGINA PRESENT, UNSPECIFIED WHETHER NATIVE OR TRANSPLANTED HEART: ICD-10-CM

## 2024-10-28 DIAGNOSIS — I25.10 CORONARY ARTERIOSCLEROSIS: Primary | ICD-10-CM

## 2024-10-28 DIAGNOSIS — E78.41 ELEVATED LIPOPROTEIN(A): ICD-10-CM

## 2024-10-28 DIAGNOSIS — I31.1 CONSTRICTIVE PERICARDITIS (HHS-HCC): ICD-10-CM

## 2024-10-28 DIAGNOSIS — R06.09 DOE (DYSPNEA ON EXERTION): ICD-10-CM

## 2024-10-28 PROCEDURE — 99215 OFFICE O/P EST HI 40 MIN: CPT | Performed by: INTERNAL MEDICINE

## 2024-10-28 PROCEDURE — 4004F PT TOBACCO SCREEN RCVD TLK: CPT | Performed by: INTERNAL MEDICINE

## 2024-10-28 PROCEDURE — 3008F BODY MASS INDEX DOCD: CPT | Performed by: INTERNAL MEDICINE

## 2024-10-28 RX ORDER — ASPIRIN 81 MG/1
81 TABLET ORAL DAILY
COMMUNITY

## 2024-10-28 ASSESSMENT — ENCOUNTER SYMPTOMS
LOSS OF SENSATION IN FEET: 0
OCCASIONAL FEELINGS OF UNSTEADINESS: 0
DEPRESSION: 0

## 2024-10-28 ASSESSMENT — PATIENT HEALTH QUESTIONNAIRE - PHQ9
2. FEELING DOWN, DEPRESSED OR HOPELESS: NOT AT ALL
SUM OF ALL RESPONSES TO PHQ9 QUESTIONS 1 AND 2: 0
1. LITTLE INTEREST OR PLEASURE IN DOING THINGS: NOT AT ALL

## 2024-10-28 ASSESSMENT — PAIN SCALES - GENERAL: PAINLEVEL_OUTOF10: 0-NO PAIN

## 2024-10-28 ASSESSMENT — COLUMBIA-SUICIDE SEVERITY RATING SCALE - C-SSRS
2. HAVE YOU ACTUALLY HAD ANY THOUGHTS OF KILLING YOURSELF?: NO
6. HAVE YOU EVER DONE ANYTHING, STARTED TO DO ANYTHING, OR PREPARED TO DO ANYTHING TO END YOUR LIFE?: NO
1. IN THE PAST MONTH, HAVE YOU WISHED YOU WERE DEAD OR WISHED YOU COULD GO TO SLEEP AND NOT WAKE UP?: NO

## 2024-10-29 PROBLEM — I25.10 CORONARY ARTERY DISEASE: Status: ACTIVE | Noted: 2024-10-28

## 2024-10-29 PROBLEM — I31.1 CONSTRICTIVE PERICARDITIS (HHS-HCC): Status: ACTIVE | Noted: 2024-10-28

## 2024-10-31 ENCOUNTER — LAB (OUTPATIENT)
Dept: LAB | Facility: LAB | Age: 59
End: 2024-10-31
Payer: COMMERCIAL

## 2024-10-31 DIAGNOSIS — E78.5 HYPERLIPIDEMIA, UNSPECIFIED HYPERLIPIDEMIA TYPE: ICD-10-CM

## 2024-10-31 DIAGNOSIS — M25.50 ARTHRALGIA, UNSPECIFIED JOINT: ICD-10-CM

## 2024-10-31 DIAGNOSIS — I25.10 CORONARY ARTERIOSCLEROSIS: ICD-10-CM

## 2024-10-31 DIAGNOSIS — M25.552 BILATERAL HIP PAIN: ICD-10-CM

## 2024-10-31 DIAGNOSIS — M25.551 BILATERAL HIP PAIN: ICD-10-CM

## 2024-10-31 LAB
ALBUMIN SERPL BCP-MCNC: 5.1 G/DL (ref 3.4–5)
ALP SERPL-CCNC: 62 U/L (ref 33–120)
ALT SERPL W P-5'-P-CCNC: 34 U/L (ref 10–52)
ANION GAP SERPL CALC-SCNC: 13 MMOL/L (ref 10–20)
AST SERPL W P-5'-P-CCNC: 19 U/L (ref 9–39)
BILIRUB SERPL-MCNC: 0.5 MG/DL (ref 0–1.2)
BUN SERPL-MCNC: 15 MG/DL (ref 6–23)
CALCIUM SERPL-MCNC: 10.8 MG/DL (ref 8.6–10.6)
CHLORIDE SERPL-SCNC: 102 MMOL/L (ref 98–107)
CHOLEST SERPL-MCNC: 188 MG/DL (ref 0–199)
CHOLESTEROL/HDL RATIO: 4
CK SERPL-CCNC: 110 U/L (ref 0–325)
CO2 SERPL-SCNC: 32 MMOL/L (ref 21–32)
CREAT SERPL-MCNC: 0.95 MG/DL (ref 0.5–1.3)
EGFRCR SERPLBLD CKD-EPI 2021: >90 ML/MIN/1.73M*2
ERYTHROCYTE [DISTWIDTH] IN BLOOD BY AUTOMATED COUNT: 12.8 % (ref 11.5–14.5)
ERYTHROCYTE [SEDIMENTATION RATE] IN BLOOD BY WESTERGREN METHOD: 7 MM/H (ref 0–20)
GLUCOSE SERPL-MCNC: 94 MG/DL (ref 74–99)
HCT VFR BLD AUTO: 47.5 % (ref 41–52)
HDLC SERPL-MCNC: 47.4 MG/DL
HGB BLD-MCNC: 15.8 G/DL (ref 13.5–17.5)
INR PPP: 0.9 (ref 0.9–1.1)
LDLC SERPL CALC-MCNC: 118 MG/DL
MCH RBC QN AUTO: 28.8 PG (ref 26–34)
MCHC RBC AUTO-ENTMCNC: 33.3 G/DL (ref 32–36)
MCV RBC AUTO: 87 FL (ref 80–100)
NON HDL CHOLESTEROL: 141 MG/DL (ref 0–149)
NRBC BLD-RTO: 0 /100 WBCS (ref 0–0)
PLATELET # BLD AUTO: 275 X10*3/UL (ref 150–450)
POTASSIUM SERPL-SCNC: 4.8 MMOL/L (ref 3.5–5.3)
PROT SERPL-MCNC: 7.7 G/DL (ref 6.4–8.2)
PROTHROMBIN TIME: 10 SECONDS (ref 9.8–12.8)
RBC # BLD AUTO: 5.48 X10*6/UL (ref 4.5–5.9)
RHEUMATOID FACT SER NEPH-ACNC: <10 IU/ML (ref 0–15)
SODIUM SERPL-SCNC: 142 MMOL/L (ref 136–145)
TRIGL SERPL-MCNC: 115 MG/DL (ref 0–149)
TSH SERPL-ACNC: 1.17 MIU/L (ref 0.44–3.98)
URATE SERPL-MCNC: 6.3 MG/DL (ref 4–7.5)
VLDL: 23 MG/DL (ref 0–40)
WBC # BLD AUTO: 6.8 X10*3/UL (ref 4.4–11.3)

## 2024-10-31 PROCEDURE — 86038 ANTINUCLEAR ANTIBODIES: CPT

## 2024-10-31 PROCEDURE — 80053 COMPREHEN METABOLIC PANEL: CPT

## 2024-10-31 PROCEDURE — 85027 COMPLETE CBC AUTOMATED: CPT

## 2024-10-31 PROCEDURE — 84443 ASSAY THYROID STIM HORMONE: CPT

## 2024-10-31 PROCEDURE — 36415 COLL VENOUS BLD VENIPUNCTURE: CPT

## 2024-10-31 PROCEDURE — 85610 PROTHROMBIN TIME: CPT

## 2024-10-31 PROCEDURE — 84550 ASSAY OF BLOOD/URIC ACID: CPT

## 2024-10-31 PROCEDURE — 86225 DNA ANTIBODY NATIVE: CPT

## 2024-10-31 PROCEDURE — 86431 RHEUMATOID FACTOR QUANT: CPT

## 2024-10-31 PROCEDURE — 82550 ASSAY OF CK (CPK): CPT

## 2024-10-31 PROCEDURE — 86235 NUCLEAR ANTIGEN ANTIBODY: CPT

## 2024-10-31 PROCEDURE — 81001 URINALYSIS AUTO W/SCOPE: CPT

## 2024-10-31 PROCEDURE — 85652 RBC SED RATE AUTOMATED: CPT

## 2024-10-31 PROCEDURE — 80061 LIPID PANEL: CPT

## 2024-11-01 ENCOUNTER — PHARMACY VISIT (OUTPATIENT)
Dept: PHARMACY | Facility: CLINIC | Age: 59
End: 2024-11-01
Payer: COMMERCIAL

## 2024-11-01 DIAGNOSIS — I25.10 CORONARY ARTERIOSCLEROSIS: Primary | ICD-10-CM

## 2024-11-01 LAB
ANA PATTERN: ABNORMAL
ANA SER QL HEP2 SUBST: POSITIVE
ANA TITR SER IF: ABNORMAL {TITER}
APPEARANCE UR: ABNORMAL
BILIRUB UR STRIP.AUTO-MCNC: NEGATIVE MG/DL
CENTROMERE B AB SER-ACNC: <0.2 AI
CHROMATIN AB SERPL-ACNC: <0.2 AI
COLOR UR: ABNORMAL
DSDNA AB SER-ACNC: <1 IU/ML
ENA JO1 AB SER QL IA: <0.2 AI
ENA RNP AB SER IA-ACNC: <0.2 AI
ENA SCL70 AB SER QL IA: <0.2 AI
ENA SM AB SER IA-ACNC: 0.2 AI
ENA SM+RNP AB SER QL IA: <0.2 AI
ENA SS-A AB SER IA-ACNC: <0.2 AI
ENA SS-B AB SER IA-ACNC: <0.2 AI
GLUCOSE UR STRIP.AUTO-MCNC: NORMAL MG/DL
HOLD SPECIMEN: NORMAL
KETONES UR STRIP.AUTO-MCNC: NEGATIVE MG/DL
LEUKOCYTE ESTERASE UR QL STRIP.AUTO: NEGATIVE
MUCOUS THREADS #/AREA URNS AUTO: NORMAL /LPF
NITRITE UR QL STRIP.AUTO: NEGATIVE
PH UR STRIP.AUTO: 5 [PH]
PROT UR STRIP.AUTO-MCNC: NEGATIVE MG/DL
RBC # UR STRIP.AUTO: ABNORMAL /UL
RBC #/AREA URNS AUTO: NORMAL /HPF
RIBOSOMAL P AB SER-ACNC: <0.2 AI
SP GR UR STRIP.AUTO: 1.02
UROBILINOGEN UR STRIP.AUTO-MCNC: NORMAL MG/DL
WBC #/AREA URNS AUTO: NORMAL /HPF

## 2024-11-01 PROCEDURE — RXMED WILLOW AMBULATORY MEDICATION CHARGE

## 2024-11-01 RX ORDER — EZETIMIBE 10 MG/1
10 TABLET ORAL DAILY
Qty: 90 TABLET | Refills: 3 | Status: SHIPPED | OUTPATIENT
Start: 2024-11-01 | End: 2025-11-01

## 2024-11-05 ENCOUNTER — HOSPITAL ENCOUNTER (OUTPATIENT)
Facility: HOSPITAL | Age: 59
Setting detail: OUTPATIENT SURGERY
Discharge: HOME | End: 2024-11-05
Attending: INTERNAL MEDICINE | Admitting: INTERNAL MEDICINE
Payer: COMMERCIAL

## 2024-11-05 VITALS
HEART RATE: 74 BPM | SYSTOLIC BLOOD PRESSURE: 147 MMHG | RESPIRATION RATE: 17 BRPM | TEMPERATURE: 97.2 F | OXYGEN SATURATION: 96 % | DIASTOLIC BLOOD PRESSURE: 70 MMHG

## 2024-11-05 DIAGNOSIS — I25.10 CORONARY ARTERY DISEASE: ICD-10-CM

## 2024-11-05 DIAGNOSIS — I25.10 CORONARY ARTERY DISEASE, UNSPECIFIED VESSEL OR LESION TYPE, UNSPECIFIED WHETHER ANGINA PRESENT, UNSPECIFIED WHETHER NATIVE OR TRANSPLANTED HEART: Primary | ICD-10-CM

## 2024-11-05 DIAGNOSIS — I25.118 ATHEROSCLEROTIC HEART DISEASE OF NATIVE CORONARY ARTERY WITH OTHER FORMS OF ANGINA PECTORIS: ICD-10-CM

## 2024-11-05 DIAGNOSIS — I35.0 NONRHEUMATIC AORTIC (VALVE) STENOSIS: ICD-10-CM

## 2024-11-05 DIAGNOSIS — I31.1 CONSTRICTIVE PERICARDITIS (HHS-HCC): ICD-10-CM

## 2024-11-05 PROCEDURE — 93567 NJX CAR CTH SPRVLV AORTGRPHY: CPT | Performed by: INTERNAL MEDICINE

## 2024-11-05 PROCEDURE — 2500000001 HC RX 250 WO HCPCS SELF ADMINISTERED DRUGS (ALT 637 FOR MEDICARE OP): Performed by: NURSE PRACTITIONER

## 2024-11-05 PROCEDURE — 2720000007 HC OR 272 NO HCPCS: Performed by: INTERNAL MEDICINE

## 2024-11-05 PROCEDURE — 99152 MOD SED SAME PHYS/QHP 5/>YRS: CPT | Performed by: INTERNAL MEDICINE

## 2024-11-05 PROCEDURE — 2550000001 HC RX 255 CONTRASTS: Performed by: INTERNAL MEDICINE

## 2024-11-05 PROCEDURE — C1894 INTRO/SHEATH, NON-LASER: HCPCS | Performed by: INTERNAL MEDICINE

## 2024-11-05 PROCEDURE — 7100000010 HC PHASE TWO TIME - EACH INCREMENTAL 1 MINUTE: Performed by: INTERNAL MEDICINE

## 2024-11-05 PROCEDURE — 7100000009 HC PHASE TWO TIME - INITIAL BASE CHARGE: Performed by: INTERNAL MEDICINE

## 2024-11-05 PROCEDURE — 99153 MOD SED SAME PHYS/QHP EA: CPT | Performed by: INTERNAL MEDICINE

## 2024-11-05 PROCEDURE — 2500000004 HC RX 250 GENERAL PHARMACY W/ HCPCS (ALT 636 FOR OP/ED): Performed by: INTERNAL MEDICINE

## 2024-11-05 PROCEDURE — 93456 R HRT CORONARY ARTERY ANGIO: CPT | Performed by: INTERNAL MEDICINE

## 2024-11-05 PROCEDURE — C1887 CATHETER, GUIDING: HCPCS | Performed by: INTERNAL MEDICINE

## 2024-11-05 PROCEDURE — 99223 1ST HOSP IP/OBS HIGH 75: CPT | Performed by: NURSE PRACTITIONER

## 2024-11-05 RX ORDER — FENTANYL CITRATE 50 UG/ML
INJECTION, SOLUTION INTRAMUSCULAR; INTRAVENOUS AS NEEDED
Status: DISCONTINUED | OUTPATIENT
Start: 2024-11-05 | End: 2024-11-05 | Stop reason: HOSPADM

## 2024-11-05 RX ORDER — ACETAMINOPHEN 160 MG/5ML
650 SOLUTION ORAL EVERY 6 HOURS PRN
Status: DISCONTINUED | OUTPATIENT
Start: 2024-11-05 | End: 2024-11-07 | Stop reason: HOSPADM

## 2024-11-05 RX ORDER — LIDOCAINE HYDROCHLORIDE 20 MG/ML
INJECTION, SOLUTION INFILTRATION; PERINEURAL AS NEEDED
Status: DISCONTINUED | OUTPATIENT
Start: 2024-11-05 | End: 2024-11-05 | Stop reason: HOSPADM

## 2024-11-05 RX ORDER — NAPROXEN SODIUM 220 MG/1
81 TABLET, FILM COATED ORAL ONCE
Status: COMPLETED | OUTPATIENT
Start: 2024-11-05 | End: 2024-11-05

## 2024-11-05 RX ORDER — ACETAMINOPHEN 325 MG/1
650 TABLET ORAL EVERY 6 HOURS PRN
Status: DISCONTINUED | OUTPATIENT
Start: 2024-11-05 | End: 2024-11-07 | Stop reason: HOSPADM

## 2024-11-05 RX ORDER — ACETAMINOPHEN 650 MG/1
650 SUPPOSITORY RECTAL EVERY 6 HOURS PRN
Status: DISCONTINUED | OUTPATIENT
Start: 2024-11-05 | End: 2024-11-07 | Stop reason: HOSPADM

## 2024-11-05 RX ORDER — MIDAZOLAM HYDROCHLORIDE 1 MG/ML
INJECTION, SOLUTION INTRAMUSCULAR; INTRAVENOUS AS NEEDED
Status: DISCONTINUED | OUTPATIENT
Start: 2024-11-05 | End: 2024-11-05 | Stop reason: HOSPADM

## 2024-11-05 ASSESSMENT — ENCOUNTER SYMPTOMS
NEUROLOGICAL NEGATIVE: 1
CONSTITUTIONAL NEGATIVE: 1
MUSCULOSKELETAL NEGATIVE: 1
ENDOCRINE NEGATIVE: 1
ALLERGIC/IMMUNOLOGIC NEGATIVE: 1
RESPIRATORY NEGATIVE: 1
GASTROINTESTINAL NEGATIVE: 1
EYES NEGATIVE: 1
HEMATOLOGIC/LYMPHATIC NEGATIVE: 1
CARDIOVASCULAR NEGATIVE: 1
PSYCHIATRIC NEGATIVE: 1

## 2024-11-05 ASSESSMENT — PAIN SCALES - GENERAL

## 2024-11-05 ASSESSMENT — PAIN - FUNCTIONAL ASSESSMENT
PAIN_FUNCTIONAL_ASSESSMENT: 0-10

## 2024-11-05 NOTE — DISCHARGE INSTRUCTIONS
Please keep hydrated, especially over the next 24 hours (after your procedure). The goal is for you to have a total of 72-96 ounces water/gatorade intake for the next 24 hours. This will help flush the dye from the procedure through your kidneys and prevent kidney injury.             CARDIAC CATHETERIZATION DISCHARGE INSTRUCTIONS     FOR SUDDEN AND SEVERE CHEST PAIN, SHORTNESS OF BREATH, EXCESSIVE BLEEDING, SIGNS OF STROKE, OR CHANGES IN MENTAL STATUS YOU SHOULD CALL 911 IMMEDIATELY.     If your provider has prescribed aspirin and/or clopidogrel (Plavix), or prasugrel (Effient), or ticagrelor (Brilinta), DO NOT STOP THESE MEDICATIONS for any reason without talking to your cardiologist first. If any of these were prescribed, you must take them every day without missing a single dose. If you are getting low on these medications, contact your provider immediately for a refill.     FOR NEXT 24 HOURS  - Upon discharge, you should return home and rest for the remainder of the day and evening. You do not have to stay on bed rest but should not be very active.  It is recommended a responsible adult be with you for the first 24 hours after the procedure.    - No driving for 24 hours after procedure. Please arrange for someone to drive you home from the hospital today.     - Do not drive, operate machinery, or use power tools for 24 hours after your procedure.     - Do not make any legal decisions for 24 hours after your procedure.     - Do not drink alcoholic beverages for 24 hours after your procedure.    WOUND CARE   *FOR FEMORAL (LEG) ACCESS*  ·      Avoid heavy lifting (over 10 pounds) for 7 days, squatting or excessive bending for 2 days, and strenuous exercise for 7 days.  ·      No submerged bathing, swimming, or hot tubs for the next 7 days, or until fully healed.  ·      Avoid sexual activity for 3-4 days until any groin discomfort has ceased.     *FOR RADIAL (WRIST) ACCESS*  ·      No lifting more than 5 pounds or  excessive use of the wrist for 24 hours - for example, treat your wrist as if it is sprained.  ·      Do not engage in vigorous activities (tennis, golf, bowling, weights) for at least 48 hours after the procedure.  ·      Do not submerge the wrist for 7 days after the procedure.  ·      You should expect mild tingling in your hand and tenderness at the puncture site for up to 3 days.    - The transparent dressing should be removed from the site 24 hours after the procedure.  Wash the site gently with soap and water. Rinse well and pat dry. Keep the area clean and dry. You may apply a Band-Aid to the site. Avoid lotions, ointments, or powders until fully healed.     - You may shower the day after your procedure.      - It is normal to notice a small bruise around the puncture site and/or a small grape sized or smaller lump. Any large bruising or large lump warrants a call to the office.     - If bleeding should occur, lay down and apply pressure to the affected area for 10 minutes.  If the bleeding stops notify your physician.  If there is a large amount of bleeding or spurting of blood CALL 911 immediately.  DO NOT drive yourself to the hospital.    - You may experience some tenderness, bruising or minimal inflammation.  If you have any concerns, you may contact the Cath Lab or if any of these symptoms become excessive, contact your cardiologist or go to the emergency room.     OTHER INSTRUCTIONS  - You may take acetaminophen (Tylenol) as directed for discomfort.  If pain is not relieved with acetaminophen (Tylenol), contact your doctor.    - If you notice or experience any of the following, you should notify your doctor or seek medical attention  Chest pain or discomfort  Change in mental status or weakness in extremities.  Dizziness, light headedness, or feeling faint.  Change in the site where the procedure was performed, such as bleeding or an increased area of bruising or swelling.  Tingling, numbness, pain,  or coolness in the leg/arm beyond the site where the procedure was performed.  Signs of infection (i.e. shaking chills, temperature > 100 degrees Fahrenheit, warmth, redness) in the leg/arm area where the procedure was performed.  Changes in urination   Bloody or black stools  Vomiting blood  Severe nose bleeds  Any excessive bleeding    - If you DO NOT have an appointment with your cardiologist within 2-4 weeks following your procedure, please contact their office.

## 2024-11-05 NOTE — Clinical Note
Universal procedure checklist and airway assessment completed. Soft, non-tender, no hepatosplenomegaly, normal bowel sounds

## 2024-11-05 NOTE — Clinical Note
04/16/2019  Devon Berry is a 75 y.o., male with h/o CAD, afib (on amiodarone), HTN, multiple GI issues, who how presents for repeat EGD.  Of note, EGD in 1/2019 was uncomplicated.    Pre-op Assessment    I have reviewed the Patient Summary Reports.     I have reviewed the Nursing Notes.   I have reviewed the Medications.     Review of Systems  Social:  Smoker, No Alcohol Use    Hematology/Oncology:        Oncology Comments: Colon cancer   Cardiovascular:   Hypertension Past MI CAD  CABG/stent Dysrhythmias atrial fibrillation hyperlipidemia EF-30%   Hepatic/GI:   GERD Horner's esophagus,  Diverticulosis of colon,  Colon cancer       Physical Exam  General:  Well nourished    Airway/Jaw/Neck:  Airway Findings: Mouth Opening: Normal Tongue: Normal  General Airway Assessment: Adult  Mallampati: III  TM Distance: Normal, at least 6 cm      Dental:  Dental Findings: In tact        Mental Status:  Mental Status Findings:  Alert and Oriented, Cooperative         Anesthesia Plan  Type of Anesthesia, risks & benefits discussed:  Anesthesia Type:  general, MAC  Patient's Preference:   Intra-op Monitoring Plan: standard ASA monitors  Intra-op Monitoring Plan Comments:   Post Op Pain Control Plan:   Post Op Pain Control Plan Comments:   Induction:   IV  Beta Blocker:  Patient is not currently on a Beta-Blocker (No further documentation required).       Informed Consent: Patient understands risks and agrees with Anesthesia plan.  Questions answered. Anesthesia consent signed with patient.  ASA Score: 3     Day of Surgery Review of History & Physical: I have interviewed and examined the patient. I have reviewed the patient's H&P dated:    H&P update referred to the provider.         Ready For Surgery From Anesthesia Perspective.        Catheter exchanged with CATHETER, PIGTAIL 155 MOD DIAGNOSTIC, 4 FR (110 CM).

## 2024-11-05 NOTE — POST-PROCEDURE NOTE
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]    Post-procedure diagnosis:   Post-op Diagnosis     * Coronary artery disease, unspecified vessel or lesion type, unspecified whether angina present, unspecified whether native or transplanted heart [I25.10]     * 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            Anticoagulation/Antiplatelet Plan:   NONE    Estimated Blood Loss:   5 mL    Anesthesia: Moderate Sedation Anesthesia Staff: No anesthesia staff entered.    Any Specimen(s) Removed:   No specimens collected during this procedure.    Disposition:   Will review with Dr. Potter.      Electronically signed by: Madi Ortiz MD, 11/5/2024 2:37 PM

## 2024-11-05 NOTE — H&P
History Of Present Illness  Gopal Blas is a 59 y.o. male presenting with OLIVIA, here for R/LHC. 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     High cholesterol     History of Burkitt's lymphoma     History of cancer     Radiculopathy, cervical region     Cervical radiculopathy    Sinus infection         Past Surgical History:  Past Surgical History:   Procedure Laterality Date    CARPAL TUNNEL RELEASE  06/23/2016    Neuroplasty Decompression Median Nerve At Carpal Tunnel    OTHER SURGICAL HISTORY  08/09/2014    Arthrodesis Cervical    OTHER SURGICAL HISTORY  09/24/2014    Arthrodesis Cervical    OTHER SURGICAL HISTORY  09/24/2014    Post Spinal Diskectomy, Osteophytectomy One Lumbar Interspac    OTHER SURGICAL HISTORY  06/23/2016    Hemilaminectomy With Disc Removal Lumbar Re-Exploration    OTHER SURGICAL HISTORY  07/07/2018    Excision Of Thymus          Social History:   reports that he has been smoking cigarettes. He has never used smokeless tobacco. He reports that he does not drink alcohol and does not use drugs.     Family History:  Family History   Problem Relation Name Age of Onset    Coronary artery disease Mother      Hypertension Mother      Hyperlipidemia Mother      No Known Problems Father          Allergies:  Allergies   Allergen Reactions    Simvastatin Other     Elevated LFTs    Tramadol Hcl Unknown        Home Medications:  Current Outpatient Medications   Medication Instructions    aspirin 81 mg, Daily    cyclobenzaprine (FLEXERIL) 10 mg, oral, 3 times daily PRN    ezetimibe (ZETIA) 10 mg, oral, Daily    nabumetone (RELAFEN) 750 mg, oral, 2 times daily    rosuvastatin (CRESTOR) 40 mg, oral, Daily       Inpatient Medications:            Review of Systems    Constitutional: Negative.    HENT: Negative.     Eyes: Negative.    Respiratory: Negative.     Cardiovascular: Negative.    Gastrointestinal: Negative.    Endocrine: Negative.    Genitourinary: Negative.    Musculoskeletal: Negative.    Skin: Negative.    Allergic/Immunologic: Negative.    Neurological: Negative.    Hematological: Negative.    Psychiatric/Behavioral: Negative.            Physical Exam  Constitutional:       General: He is awake. He is not in acute distress.     Appearance: He is not ill-appearing.   Cardiovascular:      Rate and Rhythm: Normal rate and regular rhythm.      Pulses: Normal pulses.           Radial pulses are 2+ on the right side and 2+ on the left side.        Dorsalis pedis pulses are 2+ on the right side and 2+ on the left side.      Heart sounds: Murmur heard.   Pulmonary:      Effort: Pulmonary effort is normal.      Breath sounds: Normal breath sounds and air entry.   Abdominal:      General: Bowel sounds are normal.      Palpations: Abdomen is soft.      Tenderness: There is no abdominal tenderness.   Musculoskeletal:      Right lower leg: No edema.      Left lower leg: No edema.   Skin:     General: Skin is warm and dry.   Neurological:      General: No focal deficit present.      Mental Status: He is alert and oriented to person, place, and time.      GCS: GCS eye subscore is 4. GCS verbal subscore is 5. GCS motor subscore is 6.   Psychiatric:         Mood and Affect: Mood normal.         Behavior: Behavior is cooperative.        Sedation Plan    ASA 3     Mallampati class: III.           NPO since last night around 2000    Last Recorded Vitals  There were no vitals taken for this visit.         Vitals from the Past 24 Hours            Relevant Results    Labs    CBC:   Recent Labs     10/31/24  1159 01/29/24  0004 09/26/23  1055 02/17/22  1145 09/11/20  1155 07/03/18  1141   WBC 6.8 11.4* 8.9 8.8 7.9 7.3   HGB 15.8 15.1 17.0 16.5 15.3 15.9   HCT 47.5 45.0 52.3* 48.1 46.6  "45.6    266 293 283 267 278   MCV 87 85 87 87 88 85     BMP/CMP:   Recent Labs     10/31/24  1159 08/29/24  0940 01/29/24  0004 10/05/23  1224 09/26/23  1055 02/17/22  1145    138 138 139 138 138   K 4.8 5.2 4.1 4.8 5.4* 4.6    101 103 104 102 102   BUN 15 15 16 15 17 15   CREATININE 0.95 0.84 1.00 0.89 0.93 0.97   CO2 32 26 24 25 29 26   CALCIUM 10.8* 10.4 9.6 10.4 10.7* 10.5   PROT 7.7 7.5 7.1 7.4 8.0 7.7   BILITOT 0.5 0.5 <0.2 0.6 0.5 0.4   ALKPHOS 62 57 80 53 63 61   ALT 34 34 19 38 33 68*   AST 19 22 18 22 18 31   GLUCOSE 94 96 130* 86 97 86      Lipid Panel:   Recent Labs     10/31/24  1159 08/29/24  0940 02/17/22  1145 01/22/21  0852 09/11/20  1155 03/17/20  1212 07/03/18  1141   CHOL 188 183 241* 203* 224* 335* 291*   HDL 47.4 52.0 52.9 44.3 46.0 41.9 45.1   CHHDL 4.0 3.5 4.6 4.6 4.9 8.0* 6.5*   VLDL 23 21 51* 25 26 47* 28   TRIG 115 104 257* 123 132 235* 138   NHDL 141 131 188  --   --  293  --      Cardiac       No lab exists for component: \"CK\", \"CKMBP\"   Hemoglobin A1C:   Recent Labs     08/29/24  0940   HGBA1C 5.6     TSH/ Free T4:   Recent Labs     10/31/24  1159 08/29/24  0940 02/17/22  1145 09/11/20  1155 07/03/18  1141   TSH 1.17 1.36 1.98 1.27 1.31     Iron: No results for input(s): \"FERRITIN\", \"TIBC\", \"IRONSAT\", \"BNP\" in the last 72753 hours.  Coag:   Results from last 7 days   Lab Units 10/31/24  1159   INR  0.9     ABO: No results found for: \"ABO\"    Past Cardiology Tests (Last 3 Years):    EKG:  Recent Labs     08/29/24  0820 02/20/23  1409 02/26/18  1156   ATRRATE 76 85 100   VENTRATE 76 85 100   PRINT 128 122 118   QRSDUR 88 88 84   QTCFRED 408 406 401   QTCCALCB 425 430 436     Encounter Date: 08/29/24   ECG 12 lead (Clinic Performed)   Result Value    Ventricular Rate 76    Atrial Rate 76    OK Interval 128    QRS Duration 88    QT Interval 378    QTC Calculation(Bazett) 425    P Axis 74    R Axis 23    T Axis 105    QRS Count 12    Q Onset 215    P Onset 151    P Offset " 199    T Offset 404    QTC Fredericia 408    Narrative    Normal sinus rhythm  Nonspecific T wave abnormality  Abnormal ECG  When compared with ECG of 20-FEB-2023 14:09,  No significant change was found  Confirmed by Cal Potter ([id number]) on 8/31/2024 12:12:38 PM     Echo:  Echocardiogram:   Transthoracic Echo (TTE) Complete With Contrast 09/19/2024    PHYSICIAN INTERPRETATION:  Left Ventricle: The left ventricular systolic function is normal, with a visually estimated ejection fraction of 60-65%. There are no regional left ventricular wall motion abnormalities. The left ventricular cavity size is normal. Spectral Doppler shows an impaired relaxation pattern of left ventricular diastolic filling.  Left Atrium: The left atrium is upper limits of normal in size.  Right Ventricle: The right ventricle is normal in size. There is normal right ventriclar wall thickness. There is normal right ventricular global systolic function.  Right Atrium: The right atrium is normal in size.  Aortic Valve: The aortic valve is trileaflet. There is mild aortic valve cusp calcification. There is reduced systolic aortic valve leaflet excursion. There is evidence of mild to moderate aortic valve stenosis. The aortic valve dimensionless index is 0.38. There is mild aortic valve regurgitation. The peak instantaneous gradient of the aortic valve is 31.6 mmHg. The mean gradient of the aortic valve is 21.8 mmHg.  Mitral Valve: The mitral valve is normal in structure. There is normal mitral valve leaflet mobility. There is trace mitral valve regurgitation.  Tricuspid Valve: The tricuspid valve is structurally normal. There is normal tricuspid valve leaflet mobility. There is trace tricuspid regurgitation.  Pulmonic Valve: The pulmonic valve is structurally normal. There is no indication of pulmonic valve regurgitation.  Pericardium: There is no pericardial effusion noted.  Aorta: The aortic root is normal. The Ao Sinus is 3.40 cm. The Asc Ao is  2.80 cm. There is no dilatation of the aortic arch. There is no dilatation of the ascending aorta. There is no dilatation of the aortic root. There is plaque visualized in the ascending aorta, which is classified as a Grade 2 [mild (focal or diffuse) intimal thickening of 2-3 mm] atherosclerosis.  Pulmonary Artery: The pulmonary artery is normal in size. The estimated pulmonary artery pressure is normal.  Systemic Veins: The inferior vena cava appears normal in size.      CONCLUSIONS:  1. The left ventricular systolic function is normal, with a visually estimated ejection fraction of 60-65%.  2. Spectral Doppler shows an impaired relaxation pattern of left ventricular diastolic filling.  3. There is normal right ventricular global systolic function.  4. Trace mitral valve regurgitation.  5. Mild to moderate aortic valve stenosis.  6. Mild aortic valve regurgitation.  7. The peak instantaneous gradient of the aortic valve is 31.6 mmHg.  8. There is plaque visualized in the ascending aorta.      Ejection Fractions:  LV EF   Date/Time Value Ref Range Status   09/19/2024 10:28 AM 63 %      Cath:  Coronary Angiography: No results found for this or any previous visit from the past 1800 days.    Right Heart Cath: No results found for this or any previous visit from the past 1800 days.    Stress Test:  Nuclear:No results found for this or any previous visit from the past 1800 days.    Metabolic Stress: No results found for this or any previous visit from the past 1800 days.    Cardiac Imaging:  Cardiac Scoring:   CT angio coronary art with heartflow if score >30% 09/24/2024    Addendum 9/25/2024  8:49 AM  Interpreted By:  Israel Silver,  ADDENDUM:  CT FFR results demonstrate positive CT FFR drop in the mid LAD and  circumflex artery corresponding to the areas of stenosis seen on CT  angiography. There is a CT FFR drop to 0.66 in the proximal/mid LAD.  There is a CT FFR drop in the proximal circumflex artery 2  0.7.    There are no flow limiting stenoses within the RCA distribution.      FINDINGS:  The score and distribution of calcium in the coronary arteries is as  follows:    .71,  .28,  LCx 61.79,  RCA 0,    Total 395.78    POTENTIAL STUDY LIMITATIONS:  Contrast enhancement is limited due to  extensive left-sided chest wall collaterals..    CORONARY ARTERIES:    CORONARY ANATOMY:  There is normal origin of the coronary arteries.    LEFT MAIN CORONARY ARTERY:  The left main is normal sized vessel that  bifurcates into the LAD  and circumflex. There is moderate nonocclusive calcified plaque of  the proximal left main coronary artery.    LEFT ANTERIOR DESCENDING ARTERY:  The LAD is a normal size vessel that  wraps around the apex.  It gives rise to  1 acute diagonal branches.  There is moderate atherosclerotic calcified plaque of the LAD. There  is 40-60% stenosis the mid LAD.    LEFT CIRCUMFLEX ARTERY:  The LCX is a normal size vessel, which is  non-dominant.  It gives rise to  1 obtuse marginal branches.  There is moderate calcified atherosclerotic plaque.  There is noncalcified plaque within the proximal circumflex artery  resulting in the 50-75% narrowing. There is limited visualization of  the mid to distal circumflex and a distal occlusion of the circumflex  artery can not be excluded.    RAMUS INTERMEDIUS:  The ramus is a normal sized vessel.  It gives rise to   obtuse marginal/diagonal branches.  There is no significant atherosclerotic change or stenotic disease.    RIGHT CORONARY ARTERY:  The RCA is a normal size vessel, which is  dominant .  It gives rise to a  conus branch,  jorge a branch, and  1 acute  marginal branches.  In its distal segment it bifurcates into the PDA  and PV branch. There is no significant atherosclerotic change or  stenotic disease.    CARDIAC CHAMBERS:  The cardiac chambers demonstrate normal atrioventricular and  ventriculoarterial concordance, and systemic and pulmonary  venous  return.    LEFT ATRIUM:  Normal size (    RIGHT ATRIUM:  Normal size (  INTERATRIAL SEPTUM:  Intact.    LEFT VENTRICLE:  Normal size (  - cm)    RIGHT VENTRICLE:  Normal size (  - cm)    AORTIC VALVE:  The aortic valve is  trileaflet in morphology.  No calcifications.    MITRAL VALVE:  No thickening/calcification.    THORACIC AORTA:  The visualized thoracic aorta is normal in course, caliber, and  contour. There is moderate noncalcified atherosclerotic plaque of the  aortic arch and proximal arch vessels.    The aortic arch is not included on this examination.    PERICARDIUM:  There is a focus of calcification at the level of the mitral  annulus/lateral pericardium.    CHEST:  There are prominent mediastinal and chest wall collaterals consistent  with probable subclavian/axillary vein narrowing/disease. The chest  wall is normal. No significant lymphadenopathy or mass is seen in  limited images of the mediastinum. There are scattered mediastinal  lymph nodes are felt to be reactive/inflammatory in nature. Limited  imaging through the lungs reveals no gross abnormalities. No pleural  effusion or pneumothorax.      UPPER ABDOMEN:  Limited imaging through the upper abdomen reveals no abnormalities of  the visualized organs. Note is made of prominent juxta  phrenic/diaphragmatic collateral vessels.    Impression  1.  There are extensive calcified plaque within the coronary arteries.  2. There is moderate atherosclerotic disease of the LAD with moderate  atherosclerotic plaque resulting in 40-60% narrowing.  3. There is mixed plaque within the proximal circumflex artery 50-75%  narrowing.  4. Pericardial calcifications consistent with the history of prior  chest radiation.  5. Prominent filling of chest wall collateral vessels consistent with  left innominate/axillary stenosis.      Cardiac MRI: No results found for this or any previous visit from the past 1800 days.         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)    OLIVIA  -R/LHC with Dr. Ortiz on 11/5/24  -asa prior to procedure         NP discussed with Dr. Ortiz regarding plan of care/ discharge plan      I spent 30 minutes in the professional and overall care of this patient.      Polo Thomas, MARY BETH-CNP, DNP

## 2024-11-05 NOTE — Clinical Note
Multiple views of the right coronary artery obtained using power injection. Rate = 4 mL/sec. Total volume = 7 mL.

## 2024-11-05 NOTE — Clinical Note
Single view of the aortic root obtained using power injection. Rate = 15 mL/sec. Total volume = 25 mL.

## 2024-11-05 NOTE — Clinical Note
Multiple views of the left coronary artery obtained using power injection. Rate = 4 mL/sec. Total volume = 7 mL.

## 2024-11-05 NOTE — Clinical Note
Single view of the bilateral femoral artery obtained using power injection. Rate = 10 mL/sec. Total volume = 10 mL.

## 2024-11-05 NOTE — Clinical Note
Closure device placed in the right brachial vein. Site closed by manually applied. 5 minute manual pressure

## 2024-11-05 NOTE — Clinical Note
Closure device placed in the right femoral artery. Site closed by manually applied. 15 minute manual pressure

## 2024-11-12 NOTE — RESULT ENCOUNTER NOTE
Will set up an in person visit to review results of echo and recent cardiac cath. Will try to set up for appointment this week

## 2024-11-13 ENCOUNTER — OFFICE VISIT (OUTPATIENT)
Dept: CARDIOLOGY | Facility: HOSPITAL | Age: 59
End: 2024-11-13
Payer: COMMERCIAL

## 2024-11-13 VITALS — HEART RATE: 97 BPM | BODY MASS INDEX: 29.95 KG/M2 | WEIGHT: 226 LBS | HEIGHT: 73 IN

## 2024-11-13 DIAGNOSIS — R94.31 ABNORMAL EKG: ICD-10-CM

## 2024-11-13 DIAGNOSIS — I25.10 CORONARY ARTERIOSCLEROSIS: Primary | ICD-10-CM

## 2024-11-13 LAB
ATRIAL RATE: 97 BPM
P AXIS: 81 DEGREES
P OFFSET: 204 MS
P ONSET: 153 MS
PR INTERVAL: 124 MS
Q ONSET: 215 MS
QRS COUNT: 16 BEATS
QRS DURATION: 92 MS
QT INTERVAL: 330 MS
QTC CALCULATION(BAZETT): 419 MS
QTC FREDERICIA: 387 MS
R AXIS: 59 DEGREES
T AXIS: 269 DEGREES
T OFFSET: 380 MS
VENTRICULAR RATE: 97 BPM

## 2024-11-13 PROCEDURE — 93005 ELECTROCARDIOGRAM TRACING: CPT | Performed by: INTERNAL MEDICINE

## 2024-11-13 PROCEDURE — 3008F BODY MASS INDEX DOCD: CPT | Performed by: INTERNAL MEDICINE

## 2024-11-13 PROCEDURE — 4004F PT TOBACCO SCREEN RCVD TLK: CPT | Performed by: INTERNAL MEDICINE

## 2024-11-13 PROCEDURE — 99214 OFFICE O/P EST MOD 30 MIN: CPT | Performed by: INTERNAL MEDICINE

## 2024-11-13 PROCEDURE — RXMED WILLOW AMBULATORY MEDICATION CHARGE

## 2024-11-13 PROCEDURE — 93010 ELECTROCARDIOGRAM REPORT: CPT | Performed by: INTERNAL MEDICINE

## 2024-11-13 RX ORDER — METOPROLOL SUCCINATE 25 MG/1
25 TABLET, EXTENDED RELEASE ORAL DAILY
Qty: 90 TABLET | Refills: 3 | Status: SHIPPED | OUTPATIENT
Start: 2024-11-13 | End: 2025-11-13

## 2024-11-13 ASSESSMENT — ENCOUNTER SYMPTOMS
LOSS OF SENSATION IN FEET: 0
OCCASIONAL FEELINGS OF UNSTEADINESS: 0
DEPRESSION: 0

## 2024-11-15 ENCOUNTER — PHARMACY VISIT (OUTPATIENT)
Dept: PHARMACY | Facility: CLINIC | Age: 59
End: 2024-11-15
Payer: COMMERCIAL

## 2024-11-17 DIAGNOSIS — I25.10 CORONARY ARTERIOSCLEROSIS: Primary | ICD-10-CM

## 2024-11-18 VITALS
BODY MASS INDEX: 29.95 KG/M2 | WEIGHT: 226 LBS | HEART RATE: 97 BPM | SYSTOLIC BLOOD PRESSURE: 150 MMHG | HEIGHT: 73 IN | DIASTOLIC BLOOD PRESSURE: 90 MMHG

## 2024-11-18 NOTE — PROGRESS NOTES
Subjective:  Patient returns for a follow-up to discuss his cardiac catheterization results.    Patient was recently noted to have a positive coronary CT angiogram as well as an elevated coronary calcium score of 395.78.  He also was noted to have mild to moderate aortic stenosis on echo with preserved LV function.  He did undergo his catheterization.  His right femoral arterial cath site as well as his right brachial vein cath sites have healed well.  His catheterization did reveal significant LCx and proximal LAD disease.  The coronary disease certainly appeared to be chronic.  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    He comes in today to discuss the results.  The confounding factor in his situation is that he has had recurrent problems with tonsillar infections.  He is anticipating a tonsillectomy for this.    He does appear to have some exertional dyspnea and some decrease in his endurance, but he is not having any clear unstable anginal symptomatology.  He denies any presyncope or syncope.  He is taking his aspirin, ezetimibe, and rosuvastatin compliantly.  I was pleased to see that he has been able to get off his cigarettes.    After reviewing the options with patient and his wife, I did think it was prudent to get his tonsil surgery performed and out of the way before we contemplated any additional intervention on his coronary disease, valve disease and potential pericardial disease.  I did review this with his ENT physician Dr. Tyler Mcfadden who agreed with this plan.  He will expedite this surgery in a hospital setting with very close anesthesia monitoring.    I will plan on seeing him back promptly after the surgery, and we will plan on proceeding with a cardiac MRI to assess for any potential pericardial constriction given his prior radiation therapy.  This will also assess as to  whether his aortic valve stenosis may be potentially more severe than noted by echo.  Pending these results, I will then sort out whether we should potentially contemplate surgery with AVR and CABG or potentially percutaneous revascularization with LAD stenting +/- OM branch stenting.    I did elect to get him started on metoprolol XL at 25 mg daily to help with heart rate and blood pressure control.    Objective:  General: Alert, usual pleasant self.  HEENT: Unchanged.  Lungs: Clear without crackles.  Cardiac: 2-3/6 systolic murmur.  Abdomen: Nontender.  Extremities: No edema.  Skin: No acute rash.  Neuro: Grossly intact and unchanged.    EKG: Normal sinus rhythm.  Right atrial enlargement.  At inferolateral ST and T wave abnormality.    Impression/plan:  Patient is generally doing reasonably well at this time with his aortic stenosis and known coronary disease.  He is not having any unstable symptomatology.    We will get him set up for his tonsillectomy as above.  I will see him back promptly after this, and we will plan on proceeding with his cardiac MRI to help direct further interventions for his coronary and valvular disease as indicated above.    We will plan on reassessing his lipid panel now that we have him on combination therapy.    He knows to call for any intercurrent problems before his follow-up visit.

## 2024-11-19 ENCOUNTER — TELEPHONE (OUTPATIENT)
Dept: OTOLARYNGOLOGY | Facility: CLINIC | Age: 59
End: 2024-11-19
Payer: COMMERCIAL

## 2024-11-20 ENCOUNTER — PREP FOR PROCEDURE (OUTPATIENT)
Dept: OTOLARYNGOLOGY | Facility: HOSPITAL | Age: 59
End: 2024-11-20
Payer: COMMERCIAL

## 2024-11-20 DIAGNOSIS — J35.01 CHRONIC TONSILLITIS: Primary | ICD-10-CM

## 2024-11-25 ENCOUNTER — PREP FOR PROCEDURE (OUTPATIENT)
Dept: OTOLARYNGOLOGY | Facility: HOSPITAL | Age: 59
End: 2024-11-25
Payer: COMMERCIAL

## 2024-11-25 DIAGNOSIS — J35.01 CHRONIC TONSILLITIS: Primary | ICD-10-CM

## 2024-12-03 DIAGNOSIS — E78.2 MIXED HYPERLIPIDEMIA: ICD-10-CM

## 2024-12-04 DIAGNOSIS — E78.2 MIXED HYPERLIPIDEMIA: ICD-10-CM

## 2024-12-04 PROCEDURE — RXMED WILLOW AMBULATORY MEDICATION CHARGE

## 2024-12-04 RX ORDER — ROSUVASTATIN CALCIUM 20 MG/1
40 TABLET, COATED ORAL DAILY
Qty: 180 TABLET | Refills: 0 | Status: SHIPPED | OUTPATIENT
Start: 2024-12-04

## 2024-12-04 RX ORDER — ROSUVASTATIN CALCIUM 20 MG/1
40 TABLET, COATED ORAL DAILY
Qty: 180 TABLET | Refills: 0 | Status: SHIPPED | OUTPATIENT
Start: 2024-12-04 | End: 2024-12-11 | Stop reason: WASHOUT

## 2024-12-04 NOTE — SIGNIFICANT EVENT
Madi Kennedy did a great job taking care of me.  He was very helpful, very informative and he kept checking on me.  He was very much appreciated.

## 2024-12-06 ENCOUNTER — PHARMACY VISIT (OUTPATIENT)
Dept: PHARMACY | Facility: CLINIC | Age: 59
End: 2024-12-06
Payer: COMMERCIAL

## 2024-12-11 ENCOUNTER — PRE-ADMISSION TESTING (OUTPATIENT)
Dept: PREADMISSION TESTING | Facility: HOSPITAL | Age: 59
End: 2024-12-11
Payer: COMMERCIAL

## 2024-12-11 VITALS
SYSTOLIC BLOOD PRESSURE: 130 MMHG | HEART RATE: 81 BPM | TEMPERATURE: 96.6 F | RESPIRATION RATE: 16 BRPM | OXYGEN SATURATION: 97 % | WEIGHT: 227 LBS | BODY MASS INDEX: 30.09 KG/M2 | HEIGHT: 73 IN | DIASTOLIC BLOOD PRESSURE: 73 MMHG

## 2024-12-11 DIAGNOSIS — Z01.818 PRE-OP TESTING: Primary | ICD-10-CM

## 2024-12-11 LAB
ANION GAP SERPL CALCULATED.3IONS-SCNC: 9 MMOL/L (ref 10–20)
BASOPHILS # BLD AUTO: 0.05 X10*3/UL (ref 0–0.1)
BASOPHILS NFR BLD AUTO: 0.8 %
BUN SERPL-MCNC: 13 MG/DL (ref 6–23)
CALCIUM SERPL-MCNC: 9.1 MG/DL (ref 8.6–10.3)
CHLORIDE SERPL-SCNC: 108 MMOL/L (ref 98–107)
CO2 SERPL-SCNC: 28 MMOL/L (ref 21–32)
CREAT SERPL-MCNC: 0.84 MG/DL (ref 0.5–1.3)
EGFRCR SERPLBLD CKD-EPI 2021: >90 ML/MIN/1.73M*2
EOSINOPHIL # BLD AUTO: 0.44 X10*3/UL (ref 0–0.7)
EOSINOPHIL NFR BLD AUTO: 7 %
ERYTHROCYTE [DISTWIDTH] IN BLOOD BY AUTOMATED COUNT: 12.9 % (ref 11.5–14.5)
GLUCOSE SERPL-MCNC: 109 MG/DL (ref 74–99)
HCT VFR BLD AUTO: 43.1 % (ref 41–52)
HGB BLD-MCNC: 14.3 G/DL (ref 13.5–17.5)
IMM GRANULOCYTES # BLD AUTO: 0.04 X10*3/UL (ref 0–0.7)
IMM GRANULOCYTES NFR BLD AUTO: 0.6 % (ref 0–0.9)
LYMPHOCYTES # BLD AUTO: 1.85 X10*3/UL (ref 1.2–4.8)
LYMPHOCYTES NFR BLD AUTO: 29.4 %
MCH RBC QN AUTO: 28.3 PG (ref 26–34)
MCHC RBC AUTO-ENTMCNC: 33.2 G/DL (ref 32–36)
MCV RBC AUTO: 85 FL (ref 80–100)
MONOCYTES # BLD AUTO: 0.56 X10*3/UL (ref 0.1–1)
MONOCYTES NFR BLD AUTO: 8.9 %
NEUTROPHILS # BLD AUTO: 3.35 X10*3/UL (ref 1.2–7.7)
NEUTROPHILS NFR BLD AUTO: 53.3 %
NRBC BLD-RTO: 0 /100 WBCS (ref 0–0)
PLATELET # BLD AUTO: 258 X10*3/UL (ref 150–450)
POTASSIUM SERPL-SCNC: 4.6 MMOL/L (ref 3.5–5.3)
RBC # BLD AUTO: 5.05 X10*6/UL (ref 4.5–5.9)
SODIUM SERPL-SCNC: 140 MMOL/L (ref 136–145)
WBC # BLD AUTO: 6.3 X10*3/UL (ref 4.4–11.3)

## 2024-12-11 PROCEDURE — 85025 COMPLETE CBC W/AUTO DIFF WBC: CPT

## 2024-12-11 PROCEDURE — 80048 BASIC METABOLIC PNL TOTAL CA: CPT

## 2024-12-11 PROCEDURE — 36415 COLL VENOUS BLD VENIPUNCTURE: CPT

## 2024-12-11 PROCEDURE — 99204 OFFICE O/P NEW MOD 45 MIN: CPT | Performed by: PHYSICIAN ASSISTANT

## 2024-12-11 ASSESSMENT — DUKE ACTIVITY SCORE INDEX (DASI)
CAN YOU PARTICIPATE IN STRENOUS SPORTS LIKE SWIMMING, SINGLES TENNIS, FOOTBALL, BASKETBALL, OR SKIING: NO
TOTAL_SCORE: 50.7
CAN YOU DO LIGHT WORK AROUND THE HOUSE LIKE DUSTING OR WASHING DISHES: YES
CAN YOU DO MODERATE WORK AROUND THE HOUSE LIKE VACUUMING, SWEEPING FLOORS OR CARRYING GROCERIES: YES
CAN YOU WALK INDOORS, SUCH AS AROUND YOUR HOUSE: YES
CAN YOU CLIMB A FLIGHT OF STAIRS OR WALK UP A HILL: YES
CAN YOU HAVE SEXUAL RELATIONS: YES
CAN YOU WALK A BLOCK OR TWO ON LEVEL GROUND: YES
CAN YOU DO HEAVY WORK AROUND THE HOUSE LIKE SCRUBBING FLOORS OR LIFTING AND MOVING HEAVY FURNITURE: YES
CAN YOU RUN A SHORT DISTANCE: YES
CAN YOU TAKE CARE OF YOURSELF (EAT, DRESS, BATHE, OR USE TOILET): YES
CAN YOU DO YARD WORK LIKE RAKING LEAVES, WEEDING OR PUSHING A MOWER: YES
DASI METS SCORE: 9
CAN YOU PARTICIPATE IN MODERATE RECREATIONAL ACTIVITIES LIKE GOLF, BOWLING, DANCING, DOUBLES TENNIS OR THROWING A BASEBALL OR FOOTBALL: YES

## 2024-12-11 ASSESSMENT — ENCOUNTER SYMPTOMS
NEUROLOGICAL NEGATIVE: 1
GASTROINTESTINAL NEGATIVE: 1
CONSTITUTIONAL NEGATIVE: 1
ARTHRALGIAS: 1
EYES NEGATIVE: 1
SORE THROAT: 1
PSYCHIATRIC NEGATIVE: 1
SHORTNESS OF BREATH: 1
CARDIOVASCULAR NEGATIVE: 1

## 2024-12-11 NOTE — PREPROCEDURE INSTRUCTIONS
PAT DISCHARGE INSTRUCTIONS    Please call the Same Day Surgery (SDS) Department of the hospital where your procedure will be performed after 2:00 PM the day before your surgery. If you are scheduled on a Monday, or a Tuesday following a Monday holiday, you will need to call on the last business day prior to your surgery.    [facility]  [address], [address]  [phone]    [facility]  [address] [place] [address] [phone]    [facility]  [address]  [place] [id number]  [phone]    Please let your surgeon know if:      You develop any open sores, shingles, burning or painful urination as these may increase your risk of an infection.   You no longer wish to have the surgery.   Any other personal circumstances change that may lead to the need to cancel or defer this surgery-such as being sick or getting admitted to any hospital within one week of your planned procedure.    Your contact details change, such as a change of address or phone number.    Starting now:     Please DO NOT drink alcohol or smoke for 24 hours before surgery. It is well known that quitting smoking can make a huge difference to your health and recovery from surgery. The longer you abstain from smoking, the better your chances of a healthy recovery. If you need help with quitting, call 1-800-QUIT-NOW to be connected to a trained counselor who will discuss the best methods to help you quit.     Before your surgery:    Please stop all supplements 7 days prior to surgery. Or as directed by your surgeon.   Please stop taking NSAID pain medicine such as Advil and Motrin 7 days before surgery.    If you develop any fever, cough, cold, rashes, cuts, scratches, scrapes, urinary symptoms or infection anywhere on your body (including teeth and gums) prior to surgery, please call your surgeon’s office as soon as  possible. This may require treatment to reduce the chance of cancellation on the day of surgery.    The day before your surgery:   Get a good night’s rest.  Use the special soap for bathing if you have been instructed to use one.    Scheduled surgery times may change and you will be notified if this occurs - please check your personal voicemail for any updates.     On the morning of surgery:   Wear comfortable, loose fitting clothes which open in the front. Please do not wear moisturizers, creams, lotions, makeup or perfume.    Please bring with you to surgery:   Photo ID and insurance card   Current list of medicines and allergies   Pacemaker/ Defibrillator/Heart stent cards   CPAP machine and mask    Slings/ splints/ crutches   A copy of your complete advanced directive/DHPOA.    Please do NOT bring with you to surgery:   All jewelry and valuables should be left at home.   Prosthetic devices such as contact lenses, hearing aids, dentures, eyelash extensions, hairpins and body piercings must be removed prior to going in to the surgical suite.    After outpatient surgery:   A responsible adult MUST accompany you at the time of discharge and stay with you for 24 hours after your surgery. You may NOT drive yourself home after surgery.    Do not drive, operate machinery, make critical decisions or do activities that require co-ordination or balance until after a night’s sleep.   Do not drink alcoholic beverages for 24 hours.   Instructions for resuming your medications will be provided by your surgeon.    CALL YOUR DOCTOR AFTER SURGERY IF YOU HAVE:     Chills and/or a fever of 101° F or higher.    Redness, swelling, pus or drainage from your surgical wound or a bad smell from the wound.    Lightheadedness, fainting or confusion.    Persistent vomiting (throwing up) and are not able to eat or drink for 12 hours.    Three or more loose, watery bowel movements in 24 hours (diarrhea).   Difficulty or pain while urinating(  after non-urological surgery)    Pain and swelling in your legs, especially if it is only on one side.    Difficulty breathing or are breathing faster than normal.    Any new concerning symptoms.          Preoperative Fasting Guidelines    Why must I stop eating and drinking near surgery time?  With sedation, food or liquid in your stomach can enter your lungs causing serious complications  Increases nausea and vomiting    When do I need to stop eating and drinking before my surgery?  Do not eat any food after midnight the night before your surgery/procedure.  You may have up to 13 ounces of clear liquid until TWO hours before your instructed arrival time to the hospital.  This includes water, black tea/coffee, (no milk or cream) apple juice, and electrolyte drinks (Gatorade)  You may chew gum until TWO hours before your surgery/procedure     Medication List            Accurate as of December 11, 2024  7:09 AM. Always use your most recent med list.                aspirin 81 mg EC tablet  Additional Medication Adjustments for Surgery: Take last dose 7 days before surgery     ezetimibe 10 mg tablet  Commonly known as: Zetia  Take 1 tablet (10 mg) by mouth once daily.  Medication Adjustments for Surgery: Take/Use as prescribed  Notes to patient: CONTINUE PER USUAL/TAKE NIGHT BEFORE SURGERY     metoprolol succinate XL 25 mg 24 hr tablet  Commonly known as: Toprol-XL  Take 1 tablet (25 mg) by mouth once daily. Do not crush or chew.  Medication Adjustments for Surgery: Take on the morning of surgery     rosuvastatin 20 mg tablet  Commonly known as: Crestor  Take 2 tablets (40 mg) by mouth once daily.  Medication Adjustments for Surgery: Take/Use as prescribed  Notes to patient: CONTINUE PER USUAL/TAKE NIGHT BEFORE SURGERY

## 2024-12-11 NOTE — CPM/PAT H&P
"CPM/PAT Evaluation       Name: Gopal Blas (Gopal Blas)  /Age: 1965/59 y.o.     In-Person       Chief Complaint: \"history of tonsil abscess\"    HPI  The patient is a very pleasant 59 year old male.  Earlier this year he developed a severe sore throat, mild shortness of breath and left neck swelling.  He was seen by Dr. Mcfadden and was diagnosed with a tonsillar abscess.  He was put on antibiotics on a few different occasions and he underwent incision and drainage of the abscess twice.  A tonsillectomy was recommended and scheduled for a few months ago.    He has a history of radiation heart disease.  He was diagnosed with Burkitt lymphoma at age 10 and underwent excision of the thymus gland and more than 1 year of chemotherapy and radiation.  During his PAT visit for the tonsillectomy he was instructed to get cardiac clearance.  He was seen by Dr. Cal Potter and a heart catheterization showed 2 vessel coronary artery disease, aortic stenosis and calcium around the pericardium.  The tonsil surgery was canceled and he went to cardiac clearance with Dr. Madi Ortiz on 2024 who felt he was stable from a cardiac standpoint and should have the tonsil surgery prior to any cardiac intervention.    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     High cholesterol     History of Burkitt's lymphoma     Hypertension     Radiculopathy, cervical region     Cervical radiculopathy    Sinus infection        Past Surgical History:   Procedure Laterality Date    CARDIAC CATHETERIZATION N/A 2024    Procedure: Left & Right Heart Cath w Angiography & LV;  Surgeon: Madi Ortiz MD;  Location: [facility] Cardiac Cath Lab;  Service: Cardiovascular;  Laterality: N/A;  Left and Right heart Cath, Tues 2024 at 11:00 am pt will arrive at 9:30 am @ [facility]  with Dr." Diana    CARPAL TUNNEL RELEASE  2016    Neuroplasty Decompression Median Nerve At Carpal Tunnel    CERVICAL FUSION      X 2    OTHER SURGICAL HISTORY  2014    Post Spinal Diskectomy, Osteophytectomy One Lumbar Interspac    OTHER SURGICAL HISTORY  2016    Hemilaminectomy With Disc Removal Lumbar Re-Exploration    OTHER SURGICAL HISTORY  2018    Excision Of Thymus     Family History   Problem Relation Name Age of Onset    Coronary artery disease Mother      Hypertension Mother      Hyperlipidemia Mother      No Known Problems Father       Social History     Tobacco Use    Smoking status: Former     Current packs/day: 0.00     Average packs/day: 1 pack/day for 39.8 years (39.8 ttl pk-yrs)     Types: Cigarettes     Start date:      Quit date: 2024     Years since quittin.1    Smokeless tobacco: Never   Substance Use Topics    Alcohol use: Yes     Alcohol/week: 2.0 standard drinks of alcohol     Types: 2 Standard drinks or equivalent per week     Social History     Substance and Sexual Activity   Drug Use Never     Allergies   Allergen Reactions    Simvastatin Other     Elevated LFTs    Tramadol Hcl Unknown     Current Outpatient Medications   Medication Sig Dispense Refill    aspirin 81 mg EC tablet Take 1 tablet (81 mg) by mouth once daily.      ezetimibe (Zetia) 10 mg tablet Take 1 tablet (10 mg) by mouth once daily. 90 tablet 3    metoprolol succinate XL (Toprol-XL) 25 mg 24 hr tablet Take 1 tablet (25 mg) by mouth once daily. Do not crush or chew. 90 tablet 3    rosuvastatin (Crestor) 20 mg tablet Take 2 tablets (40 mg) by mouth once daily. 180 tablet 0     No current facility-administered medications for this visit.     Review of Systems   Constitutional: Negative.    HENT:  Positive for sore throat.    Eyes: Negative.    Respiratory:  Positive for shortness of breath.    Cardiovascular: Negative.    Gastrointestinal: Negative.    Genitourinary: Negative.   "  Musculoskeletal:  Positive for arthralgias.   Neurological: Negative.    Psychiatric/Behavioral: Negative.       /73   Pulse 81   Temp 35.9 °C (96.6 °F) (Temporal)   Resp 16   Ht 1.854 m (6' 1\")   Wt 103 kg (227 lb)   SpO2 97%   BMI 29.95 kg/m²     Physical Exam  Vitals reviewed.   Constitutional:       Appearance: Normal appearance.   HENT:      Head: Normocephalic and atraumatic.      Mouth/Throat:      Mouth: Mucous membranes are moist.      Pharynx: Oropharynx is clear.      Comments: Tonsils appear normal in size.  Eyes:      Extraocular Movements: Extraocular movements intact.      Pupils: Pupils are equal, round, and reactive to light.   Cardiovascular:      Rate and Rhythm: Normal rate and regular rhythm.      Heart sounds: Murmur (systolic murmur Grade II/VI noted at the right mid-sternal border.) heard.   Abdominal:      General: Bowel sounds are normal.      Palpations: Abdomen is soft.   Musculoskeletal:         General: No swelling.   Lymphadenopathy:      Cervical: No cervical adenopathy.   Skin:     General: Skin is warm and dry.   Neurological:      General: No focal deficit present.      Mental Status: He is alert and oriented to person, place, and time.   Psychiatric:         Mood and Affect: Mood normal.         Behavior: Behavior normal.        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

## 2024-12-11 NOTE — H&P (VIEW-ONLY)
"CPM/PAT Evaluation       Name: Gopal Blas (Gopal Blas)  /Age: 1965/59 y.o.     In-Person       Chief Complaint: \"history of tonsil abscess\"    HPI  The patient is a very pleasant 59 year old male.  Earlier this year he developed a severe sore throat, mild shortness of breath and left neck swelling.  He was seen by Dr. Mcfadden and was diagnosed with a tonsillar abscess.  He was put on antibiotics on a few different occasions and he underwent incision and drainage of the abscess twice.  A tonsillectomy was recommended and scheduled for a few months ago.    He has a history of radiation heart disease.  He was diagnosed with Burkitt lymphoma at age 10 and underwent excision of the thymus gland and more than 1 year of chemotherapy and radiation.  During his PAT visit for the tonsillectomy he was instructed to get cardiac clearance.  He was seen by Dr. Cal Potter and a heart catheterization showed 2 vessel coronary artery disease, aortic stenosis and calcium around the pericardium.  The tonsil surgery was canceled and he went to cardiac clearance with Dr. Madi Ortiz on 2024 who felt he was stable from a cardiac standpoint and should have the tonsil surgery prior to any cardiac intervention.    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     High cholesterol     History of Burkitt's lymphoma     Hypertension     Radiculopathy, cervical region     Cervical radiculopathy    Sinus infection        Past Surgical History:   Procedure Laterality Date    CARDIAC CATHETERIZATION N/A 2024    Procedure: Left & Right Heart Cath w Angiography & LV;  Surgeon: Madi Ortiz MD;  Location: [facility] Cardiac Cath Lab;  Service: Cardiovascular;  Laterality: N/A;  Left and Right heart Cath, Tues 2024 at 11:00 am pt will arrive at 9:30 am @ [facility]  with Dr." Diana    CARPAL TUNNEL RELEASE  2016    Neuroplasty Decompression Median Nerve At Carpal Tunnel    CERVICAL FUSION      X 2    OTHER SURGICAL HISTORY  2014    Post Spinal Diskectomy, Osteophytectomy One Lumbar Interspac    OTHER SURGICAL HISTORY  2016    Hemilaminectomy With Disc Removal Lumbar Re-Exploration    OTHER SURGICAL HISTORY  2018    Excision Of Thymus     Family History   Problem Relation Name Age of Onset    Coronary artery disease Mother      Hypertension Mother      Hyperlipidemia Mother      No Known Problems Father       Social History     Tobacco Use    Smoking status: Former     Current packs/day: 0.00     Average packs/day: 1 pack/day for 39.8 years (39.8 ttl pk-yrs)     Types: Cigarettes     Start date:      Quit date: 2024     Years since quittin.1    Smokeless tobacco: Never   Substance Use Topics    Alcohol use: Yes     Alcohol/week: 2.0 standard drinks of alcohol     Types: 2 Standard drinks or equivalent per week     Social History     Substance and Sexual Activity   Drug Use Never     Allergies   Allergen Reactions    Simvastatin Other     Elevated LFTs    Tramadol Hcl Unknown     Current Outpatient Medications   Medication Sig Dispense Refill    aspirin 81 mg EC tablet Take 1 tablet (81 mg) by mouth once daily.      ezetimibe (Zetia) 10 mg tablet Take 1 tablet (10 mg) by mouth once daily. 90 tablet 3    metoprolol succinate XL (Toprol-XL) 25 mg 24 hr tablet Take 1 tablet (25 mg) by mouth once daily. Do not crush or chew. 90 tablet 3    rosuvastatin (Crestor) 20 mg tablet Take 2 tablets (40 mg) by mouth once daily. 180 tablet 0     No current facility-administered medications for this visit.     Review of Systems   Constitutional: Negative.    HENT:  Positive for sore throat.    Eyes: Negative.    Respiratory:  Positive for shortness of breath.    Cardiovascular: Negative.    Gastrointestinal: Negative.    Genitourinary: Negative.   "  Musculoskeletal:  Positive for arthralgias.   Neurological: Negative.    Psychiatric/Behavioral: Negative.       /73   Pulse 81   Temp 35.9 °C (96.6 °F) (Temporal)   Resp 16   Ht 1.854 m (6' 1\")   Wt 103 kg (227 lb)   SpO2 97%   BMI 29.95 kg/m²     Physical Exam  Vitals reviewed.   Constitutional:       Appearance: Normal appearance.   HENT:      Head: Normocephalic and atraumatic.      Mouth/Throat:      Mouth: Mucous membranes are moist.      Pharynx: Oropharynx is clear.      Comments: Tonsils appear normal in size.  Eyes:      Extraocular Movements: Extraocular movements intact.      Pupils: Pupils are equal, round, and reactive to light.   Cardiovascular:      Rate and Rhythm: Normal rate and regular rhythm.      Heart sounds: Murmur (systolic murmur Grade II/VI noted at the right mid-sternal border.) heard.   Abdominal:      General: Bowel sounds are normal.      Palpations: Abdomen is soft.   Musculoskeletal:         General: No swelling.   Lymphadenopathy:      Cervical: No cervical adenopathy.   Skin:     General: Skin is warm and dry.   Neurological:      General: No focal deficit present.      Mental Status: He is alert and oriented to person, place, and time.   Psychiatric:         Mood and Affect: Mood normal.         Behavior: Behavior normal.        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

## 2024-12-16 ENCOUNTER — ANESTHESIA (OUTPATIENT)
Dept: OPERATING ROOM | Facility: HOSPITAL | Age: 59
End: 2024-12-16
Payer: COMMERCIAL

## 2024-12-16 ENCOUNTER — ANESTHESIA EVENT (OUTPATIENT)
Dept: OPERATING ROOM | Facility: HOSPITAL | Age: 59
End: 2024-12-16
Payer: COMMERCIAL

## 2024-12-16 ENCOUNTER — HOSPITAL ENCOUNTER (OUTPATIENT)
Facility: HOSPITAL | Age: 59
Setting detail: OUTPATIENT SURGERY
Discharge: HOME | End: 2024-12-16
Attending: OTOLARYNGOLOGY | Admitting: OTOLARYNGOLOGY
Payer: COMMERCIAL

## 2024-12-16 VITALS
SYSTOLIC BLOOD PRESSURE: 125 MMHG | OXYGEN SATURATION: 95 % | HEART RATE: 67 BPM | DIASTOLIC BLOOD PRESSURE: 77 MMHG | RESPIRATION RATE: 15 BRPM | TEMPERATURE: 97.2 F

## 2024-12-16 DIAGNOSIS — G89.18 POST-OP PAIN: Primary | ICD-10-CM

## 2024-12-16 DIAGNOSIS — J35.01 CHRONIC TONSILLITIS: ICD-10-CM

## 2024-12-16 PROCEDURE — 2500000004 HC RX 250 GENERAL PHARMACY W/ HCPCS (ALT 636 FOR OP/ED): Performed by: STUDENT IN AN ORGANIZED HEALTH CARE EDUCATION/TRAINING PROGRAM

## 2024-12-16 PROCEDURE — 2720000007 HC OR 272 NO HCPCS: Performed by: OTOLARYNGOLOGY

## 2024-12-16 PROCEDURE — 7100000001 HC RECOVERY ROOM TIME - INITIAL BASE CHARGE: Performed by: OTOLARYNGOLOGY

## 2024-12-16 PROCEDURE — A42826 PR REMOVAL OF TONSILS,12+ Y/O

## 2024-12-16 PROCEDURE — 3700000001 HC GENERAL ANESTHESIA TIME - INITIAL BASE CHARGE: Performed by: OTOLARYNGOLOGY

## 2024-12-16 PROCEDURE — 7100000010 HC PHASE TWO TIME - EACH INCREMENTAL 1 MINUTE: Performed by: OTOLARYNGOLOGY

## 2024-12-16 PROCEDURE — 2500000004 HC RX 250 GENERAL PHARMACY W/ HCPCS (ALT 636 FOR OP/ED)

## 2024-12-16 PROCEDURE — 42826 REMOVAL OF TONSILS: CPT | Performed by: OTOLARYNGOLOGY

## 2024-12-16 PROCEDURE — 2500000005 HC RX 250 GENERAL PHARMACY W/O HCPCS

## 2024-12-16 PROCEDURE — 7100000009 HC PHASE TWO TIME - INITIAL BASE CHARGE: Performed by: OTOLARYNGOLOGY

## 2024-12-16 PROCEDURE — A4649 SURGICAL SUPPLIES: HCPCS | Performed by: OTOLARYNGOLOGY

## 2024-12-16 PROCEDURE — 3600000008 HC OR TIME - EACH INCREMENTAL 1 MINUTE - PROCEDURE LEVEL THREE: Performed by: OTOLARYNGOLOGY

## 2024-12-16 PROCEDURE — A42826 PR REMOVAL OF TONSILS,12+ Y/O: Performed by: STUDENT IN AN ORGANIZED HEALTH CARE EDUCATION/TRAINING PROGRAM

## 2024-12-16 PROCEDURE — 3600000003 HC OR TIME - INITIAL BASE CHARGE - PROCEDURE LEVEL THREE: Performed by: OTOLARYNGOLOGY

## 2024-12-16 PROCEDURE — 7100000002 HC RECOVERY ROOM TIME - EACH INCREMENTAL 1 MINUTE: Performed by: OTOLARYNGOLOGY

## 2024-12-16 PROCEDURE — 3700000002 HC GENERAL ANESTHESIA TIME - EACH INCREMENTAL 1 MINUTE: Performed by: OTOLARYNGOLOGY

## 2024-12-16 RX ORDER — PROCHLORPERAZINE EDISYLATE 5 MG/ML
5 INJECTION INTRAMUSCULAR; INTRAVENOUS ONCE AS NEEDED
Status: DISCONTINUED | OUTPATIENT
Start: 2024-12-16 | End: 2024-12-16 | Stop reason: HOSPADM

## 2024-12-16 RX ORDER — HYDROCODONE BITARTRATE AND ACETAMINOPHEN 5; 325 MG/1; MG/1
1 TABLET ORAL EVERY 6 HOURS PRN
Qty: 20 TABLET | Refills: 0 | Status: SHIPPED | OUTPATIENT
Start: 2024-12-16

## 2024-12-16 RX ORDER — FENTANYL CITRATE 50 UG/ML
50 INJECTION, SOLUTION INTRAMUSCULAR; INTRAVENOUS EVERY 5 MIN PRN
Status: DISCONTINUED | OUTPATIENT
Start: 2024-12-16 | End: 2024-12-16 | Stop reason: HOSPADM

## 2024-12-16 RX ORDER — LIDOCAINE HYDROCHLORIDE 10 MG/ML
INJECTION, SOLUTION INFILTRATION; PERINEURAL AS NEEDED
Status: DISCONTINUED | OUTPATIENT
Start: 2024-12-16 | End: 2024-12-16

## 2024-12-16 RX ORDER — FENTANYL CITRATE 50 UG/ML
INJECTION, SOLUTION INTRAMUSCULAR; INTRAVENOUS AS NEEDED
Status: DISCONTINUED | OUTPATIENT
Start: 2024-12-16 | End: 2024-12-16

## 2024-12-16 RX ORDER — HYDRALAZINE HYDROCHLORIDE 20 MG/ML
5 INJECTION INTRAMUSCULAR; INTRAVENOUS EVERY 30 MIN PRN
Status: DISCONTINUED | OUTPATIENT
Start: 2024-12-16 | End: 2024-12-16 | Stop reason: HOSPADM

## 2024-12-16 RX ORDER — MIDAZOLAM HYDROCHLORIDE 1 MG/ML
INJECTION, SOLUTION INTRAMUSCULAR; INTRAVENOUS AS NEEDED
Status: DISCONTINUED | OUTPATIENT
Start: 2024-12-16 | End: 2024-12-16

## 2024-12-16 RX ORDER — SODIUM CHLORIDE, SODIUM LACTATE, POTASSIUM CHLORIDE, CALCIUM CHLORIDE 600; 310; 30; 20 MG/100ML; MG/100ML; MG/100ML; MG/100ML
100 INJECTION, SOLUTION INTRAVENOUS CONTINUOUS
Status: DISCONTINUED | OUTPATIENT
Start: 2024-12-16 | End: 2024-12-16 | Stop reason: HOSPADM

## 2024-12-16 RX ORDER — FENTANYL CITRATE 50 UG/ML
25 INJECTION, SOLUTION INTRAMUSCULAR; INTRAVENOUS EVERY 5 MIN PRN
Status: DISCONTINUED | OUTPATIENT
Start: 2024-12-16 | End: 2024-12-16 | Stop reason: HOSPADM

## 2024-12-16 RX ORDER — ONDANSETRON HYDROCHLORIDE 2 MG/ML
4 INJECTION, SOLUTION INTRAVENOUS ONCE AS NEEDED
Status: DISCONTINUED | OUTPATIENT
Start: 2024-12-16 | End: 2024-12-16 | Stop reason: HOSPADM

## 2024-12-16 RX ORDER — ONDANSETRON HYDROCHLORIDE 2 MG/ML
INJECTION, SOLUTION INTRAVENOUS AS NEEDED
Status: DISCONTINUED | OUTPATIENT
Start: 2024-12-16 | End: 2024-12-16

## 2024-12-16 RX ORDER — LABETALOL HYDROCHLORIDE 5 MG/ML
5 INJECTION, SOLUTION INTRAVENOUS ONCE AS NEEDED
Status: DISCONTINUED | OUTPATIENT
Start: 2024-12-16 | End: 2024-12-16 | Stop reason: HOSPADM

## 2024-12-16 RX ORDER — MEPERIDINE HYDROCHLORIDE 25 MG/ML
12.5 INJECTION INTRAMUSCULAR; INTRAVENOUS; SUBCUTANEOUS EVERY 10 MIN PRN
Status: DISCONTINUED | OUTPATIENT
Start: 2024-12-16 | End: 2024-12-16 | Stop reason: HOSPADM

## 2024-12-16 RX ORDER — HYDROMORPHONE HYDROCHLORIDE 2 MG/ML
INJECTION, SOLUTION INTRAMUSCULAR; INTRAVENOUS; SUBCUTANEOUS AS NEEDED
Status: DISCONTINUED | OUTPATIENT
Start: 2024-12-16 | End: 2024-12-16

## 2024-12-16 RX ORDER — PHENYLEPHRINE HCL IN 0.9% NACL 1 MG/10 ML
SYRINGE (ML) INTRAVENOUS AS NEEDED
Status: DISCONTINUED | OUTPATIENT
Start: 2024-12-16 | End: 2024-12-16

## 2024-12-16 RX ORDER — ALBUTEROL SULFATE 0.83 MG/ML
2.5 SOLUTION RESPIRATORY (INHALATION) ONCE AS NEEDED
Status: DISCONTINUED | OUTPATIENT
Start: 2024-12-16 | End: 2024-12-16 | Stop reason: HOSPADM

## 2024-12-16 RX ORDER — ROCURONIUM BROMIDE 10 MG/ML
INJECTION, SOLUTION INTRAVENOUS AS NEEDED
Status: DISCONTINUED | OUTPATIENT
Start: 2024-12-16 | End: 2024-12-16

## 2024-12-16 RX ORDER — ETOMIDATE 2 MG/ML
INJECTION INTRAVENOUS AS NEEDED
Status: DISCONTINUED | OUTPATIENT
Start: 2024-12-16 | End: 2024-12-16

## 2024-12-16 RX ORDER — IPRATROPIUM BROMIDE 0.5 MG/2.5ML
500 SOLUTION RESPIRATORY (INHALATION) AS NEEDED
Status: DISCONTINUED | OUTPATIENT
Start: 2024-12-16 | End: 2024-12-16 | Stop reason: HOSPADM

## 2024-12-16 RX ORDER — DIPHENHYDRAMINE HYDROCHLORIDE 50 MG/ML
12.5 INJECTION INTRAMUSCULAR; INTRAVENOUS ONCE AS NEEDED
Status: DISCONTINUED | OUTPATIENT
Start: 2024-12-16 | End: 2024-12-16 | Stop reason: HOSPADM

## 2024-12-16 RX ORDER — PROPOFOL 10 MG/ML
INJECTION, EMULSION INTRAVENOUS AS NEEDED
Status: DISCONTINUED | OUTPATIENT
Start: 2024-12-16 | End: 2024-12-16

## 2024-12-16 ASSESSMENT — PAIN SCALES - GENERAL
PAINLEVEL_OUTOF10: 3
PAINLEVEL_OUTOF10: 7
PAINLEVEL_OUTOF10: 0 - NO PAIN
PAINLEVEL_OUTOF10: 0 - NO PAIN
PAINLEVEL_OUTOF10: 4
PAINLEVEL_OUTOF10: 3
PAINLEVEL_OUTOF10: 0 - NO PAIN
PAINLEVEL_OUTOF10: 7

## 2024-12-16 ASSESSMENT — PAIN DESCRIPTION - LOCATION
LOCATION: THROAT
LOCATION: THROAT

## 2024-12-16 ASSESSMENT — PAIN - FUNCTIONAL ASSESSMENT
PAIN_FUNCTIONAL_ASSESSMENT: 0-10

## 2024-12-16 ASSESSMENT — COLUMBIA-SUICIDE SEVERITY RATING SCALE - C-SSRS
2. HAVE YOU ACTUALLY HAD ANY THOUGHTS OF KILLING YOURSELF?: NO
1. IN THE PAST MONTH, HAVE YOU WISHED YOU WERE DEAD OR WISHED YOU COULD GO TO SLEEP AND NOT WAKE UP?: NO
6. HAVE YOU EVER DONE ANYTHING, STARTED TO DO ANYTHING, OR PREPARED TO DO ANYTHING TO END YOUR LIFE?: NO

## 2024-12-16 NOTE — POST-PROCEDURE NOTE
Patient brought back to room from PACU.   He is alert and awake.  Throat sore, pain 3/10.  Tolerated icechips and water, does not want to try a snack.  Spouse brought back to room.  Discharge instructions reviewed, verbalized underdstanding.

## 2024-12-16 NOTE — OP NOTE
OP NOTE     Date: 2024  OR Location: [facility]    Name: Gopal Blas : 1965 Age: 59 y.o. MRN: [id number]  Sex: male      Diagnosis  Pre-op Diagnosis    Pre-Op Diagnosis Codes:      * Chronic tonsillitis [J35.01] Post-op Diagnosis     Pre-Op Diagnosis Codes:      * Chronic tonsillitis [J35.01]     Procedures    Tonsillectomy     Surgeons      Tyler Mcfadden MD     Assistant:  See OR log    Procedure Summary  Anesthesia: General  Anesthesia Staff: Anesthesiologist: Arcadio Hobson MD  C-AA: SHIRA Larkin    Estimated Blood Loss: 5 cc      Specimen:   ID Type Source Tests Collected by Time   1 : bilateral tonsils, right tonsil has a safety pin Tissue TONSILS BILATERAL SURGICAL PATHOLOGY EXAM Tyler Mcfadden MD 2024 0836         Drains and/or Catheters: None     Implants: NONE     Findings: 2+ tonsils, cryptic, scarring and pus on left.    Indications: Gopal Blas is a 59 y.o. year old male patient who is having surgery for recurrent episodes of tonsillitis and peritonsillar abscess.  The above procedure was recommended and a detailed discussion was had of the risks, goals, and alternatives.  He presents today for this procedure he is cardiology visit was noted and we have agreed to proceed with this first because his cardiology intervention will likely require some longer-term antiplatelet therapy.      The patient was seen in the preoperative area. The risks, benefits, complications, treatment options, non-operative alternatives, expected recovery and outcomes were discussed with the patient. The patient concurred with the proposed plan, giving informed consent.  The site of surgery was properly noted/marked if necessary per policy. The patient has been actively warmed in preoperative area.     Procedure Details:   The patient was seen and identified in the preoperative area, transported to the operating room, and positioned on the table in a supine fashion. General anesthesia was  induced and the patient was orotracheally intubated without difficulty. The table was turned ninety degrees and the patient was draped in the standard fashion for adenotonsillectomy. A Kylie-Harman mouth gag was placed and suspended from the [place] stand. Red rubber catheters were used to retract the soft palate bilaterally. The uvula was normal to inspection, and the palate was normal to inspection and palpation. Mirror examination of the nasopharynx revealed no significant adenoid hypertrophy.  The right tonsil was dissected with electrocautery in an extracapsular fashion.  This was marked.  The left tonsil was then dissected in an extracapsular fashion as well and removed.  On the left side there were small pockets of pus expressed.   Bilateral bleeding points of the tonsillar fossae were cauterized with suction cautery.  At the conclusion, hemostasis was excellent, the wound was copiously irrigated, the gag was released for one minute and resuspended. There was no further bleeding. The contents of the stomach were evacuated with orogastric suction. The patient was taken out of suspension, the red rubber catheters were removed, and the patient was returned to the initial position, awakened, extubated and transported to the recovery room in good condition. The procedure was tolerated well and there were no apparent intraoperative complications.       Complications:  None; patient tolerated the procedure well.    Disposition: PACU - hemodynamically stable.  Condition: stable             Tyler Mcfadden MD  Phone Number: [phone]

## 2024-12-16 NOTE — ANESTHESIA POSTPROCEDURE EVALUATION
Patient: Gopal Blas    Procedure Summary       Date: 12/16/24 Room / Location: [facility] / [facility]    Anesthesia Start: 0757 Anesthesia Stop: 0901    Procedure: Tonsillectomy (Bilateral) Diagnosis:       Chronic tonsillitis      (Chronic tonsillitis [J35.01])    Surgeons: Tyler Mcfadden MD Responsible Provider: Arcadio Hobson MD    Anesthesia Type: general ASA Status: 2            Anesthesia Type: general    Vitals Value Taken Time   /80 12/16/24 1011   Temp 36.2 °C (97.2 °F) 12/16/24 1005   Pulse 66 12/16/24 1010   Resp 9 12/16/24 1010   SpO2 89 % 12/16/24 1013   Vitals shown include unfiled device data.    Anesthesia Post Evaluation    Patient location during evaluation: PACU  Patient participation: complete - patient participated  Level of consciousness: awake  Pain management: adequate  Multimodal analgesia pain management approach  Airway patency: patent  Cardiovascular status: acceptable and hemodynamically stable  Respiratory status: acceptable and spontaneous ventilation  Hydration status: euvolemic  Postoperative Nausea and Vomiting: none  Comments: No nausea or vomiting        No notable events documented.

## 2024-12-16 NOTE — ANESTHESIA PROCEDURE NOTES
Airway  Date/Time: 12/16/2024 8:14 AM  Urgency: elective    Airway not difficult    Staffing  Performed: SHIRA   Authorized by: Arcadio Hobson MD    Performed by: SHIRA Larkin  Patient location during procedure: OR    Indications and Patient Condition  Indications for airway management: anesthesia  Spontaneous Ventilation: absent  Sedation level: deep  Preoxygenated: yes  Patient position: sniffing  Mask difficulty assessment: 3 - difficult mask (inadequate, unstable or two providers) +/- NMBA    Final Airway Details  Final airway type: endotracheal airway      Successful airway: ETT  Cuffed: yes   Successful intubation technique: video laryngoscopy  Facilitating devices/methods: intubating stylet  Endotracheal tube insertion site: oral  Blade type: Hyperangulated glidescope blade.  Blade size: #4  ETT size (mm): 7.5 (Oral REBEL)  Cormack-Lehane Classification: grade I - full view of glottis  Placement verified by: chest auscultation and capnometry   Measured from: lips  Number of attempts at approach: 1

## 2024-12-16 NOTE — ANESTHESIA PREPROCEDURE EVALUATION
Patient: Gopal Blas    Procedure Information       Date/Time: 12/16/24 0730    Procedure: Tonsillectomy (Bilateral)    Location: [facility] / [facility]    Surgeons: Tyler Mcfadden MD            Relevant Problems   Anesthesia (within normal limits)      Cardiac  Dr. Ortiz clearance in chart. Plan is to re-evaluate for cardiac interventions after this surgery today.   (+) Abnormal ECG   (+) Abnormal EKG   (+) Coronary arteriosclerosis   (+) Coronary artery disease   (+) HLD (hyperlipidemia)   (+) Nonrheumatic aortic valve stenosis   (+) Systolic murmur   (-) History of coronary artery bypass graft   (-) Pacemaker      Pulmonary   (+) OLIVIA (dyspnea on exertion)   (-) Asthma   (-) Chronic obstructive pulmonary disease (Multi)   (-) ARY (obstructive sleep apnea)      Neuro   (+) Right lumbar radiculopathy   (-) CVA (cerebral vascular accident) (Multi)   (-) Seizures (Multi)      Endocrine   (-) Diabetes mellitus, type 2 (Multi)      Hematology   (+) Burkitt lymphoma   (-) Coagulopathy (Multi)      Musculoskeletal   (+) Lumbar disc displacement without myelopathy      Skin   (+) Eczema       Clinical information reviewed:   Tobacco  Allergies  Meds   Med Hx  Surg Hx   Fam Hx  Soc Hx        NPO Detail:  NPO/Void Status  Carbohydrate Drink Given Prior to Surgery? : N  Date of Last Liquid: 12/15/24  Time of Last Liquid: 2100  Date of Last Solid: 12/15/24  Time of Last Solid: 1930         Physical Exam    Airway  Mallampati: II  TM distance: >3 FB  Neck ROM: full     Cardiovascular    Dental    Pulmonary    Abdominal            Anesthesia Plan    History of general anesthesia?: yes  History of complications of general anesthesia?: no    ASA 2     general     intravenous induction   Postoperative administration of opioids is intended.  Anesthetic plan and risks discussed with patient.

## 2024-12-27 LAB
LABORATORY COMMENT REPORT: NORMAL
PATH REPORT.FINAL DX SPEC: NORMAL
PATH REPORT.GROSS SPEC: NORMAL
PATH REPORT.MICROSCOPIC SPEC OTHER STN: NORMAL
PATH REPORT.RELEVANT HX SPEC: NORMAL
PATH REPORT.TOTAL CANCER: NORMAL

## 2025-01-06 ENCOUNTER — TELEPHONE (OUTPATIENT)
Dept: SCHEDULING | Age: 60
End: 2025-01-06
Payer: COMMERCIAL

## 2025-01-07 DIAGNOSIS — I35.0 NONRHEUMATIC AORTIC VALVE STENOSIS: ICD-10-CM

## 2025-01-07 DIAGNOSIS — I25.10 CORONARY ARTERY DISEASE INVOLVING NATIVE CORONARY ARTERY OF NATIVE HEART WITHOUT ANGINA PECTORIS: ICD-10-CM

## 2025-01-07 DIAGNOSIS — I31.1 CONSTRICTIVE PERICARDITIS (HHS-HCC): Primary | ICD-10-CM

## 2025-01-13 ENCOUNTER — APPOINTMENT (OUTPATIENT)
Dept: OTOLARYNGOLOGY | Facility: CLINIC | Age: 60
End: 2025-01-13
Payer: COMMERCIAL

## 2025-01-13 VITALS — TEMPERATURE: 97.1 F | HEIGHT: 73 IN | WEIGHT: 230 LBS | BODY MASS INDEX: 30.48 KG/M2

## 2025-01-13 DIAGNOSIS — J35.1 TONSILLAR HYPERTROPHY: Primary | ICD-10-CM

## 2025-01-13 PROCEDURE — 3008F BODY MASS INDEX DOCD: CPT | Performed by: OTOLARYNGOLOGY

## 2025-01-13 PROCEDURE — 99024 POSTOP FOLLOW-UP VISIT: CPT | Performed by: OTOLARYNGOLOGY

## 2025-01-13 PROCEDURE — 1036F TOBACCO NON-USER: CPT | Performed by: OTOLARYNGOLOGY

## 2025-01-13 NOTE — PROGRESS NOTES
59-year-old male around 4 weeks status post tonsillectomy.  Pathology benign.  Typical postoperative recovery.  The patient is doing well now.  Eating and drinking normally.  Breathing normally.  Speaking normally.      Exam:  Awake, alert, no apparent distress.  Oropharynx healing well.  Uvula normal.  Tonsillar pillars intact.  No cervical lymphadenopathy to palpation      We reviewed the pathology and wound care was reviewed.  He is to proceed with his further cardiology workup and I will see him back at any point as needed

## 2025-01-17 ENCOUNTER — HOSPITAL ENCOUNTER (OUTPATIENT)
Dept: RADIOLOGY | Facility: HOSPITAL | Age: 60
Discharge: HOME | End: 2025-01-17
Payer: COMMERCIAL

## 2025-01-17 DIAGNOSIS — I31.1 CONSTRICTIVE PERICARDITIS (HHS-HCC): ICD-10-CM

## 2025-01-17 DIAGNOSIS — I25.10 CORONARY ARTERY DISEASE INVOLVING NATIVE CORONARY ARTERY OF NATIVE HEART WITHOUT ANGINA PECTORIS: ICD-10-CM

## 2025-01-17 DIAGNOSIS — I35.0 NONRHEUMATIC AORTIC VALVE STENOSIS: ICD-10-CM

## 2025-01-17 PROCEDURE — A9575 INJ GADOTERATE MEGLUMI 0.1ML: HCPCS | Performed by: INTERNAL MEDICINE

## 2025-01-17 PROCEDURE — 75561 CARDIAC MRI FOR MORPH W/DYE: CPT

## 2025-01-17 PROCEDURE — 2550000001 HC RX 255 CONTRASTS: Performed by: INTERNAL MEDICINE

## 2025-01-17 RX ORDER — GADOTERATE MEGLUMINE 376.9 MG/ML
40 INJECTION INTRAVENOUS
Status: COMPLETED | OUTPATIENT
Start: 2025-01-17 | End: 2025-01-17

## 2025-01-17 RX ADMIN — GADOTERATE MEGLUMINE 40 ML: 376.9 INJECTION INTRAVENOUS at 09:13

## 2025-01-17 NOTE — RESULT ENCOUNTER NOTE
Reviewed MRI.  No evidence of pericardial constriction.  Aortic stenosis is moderate, peak gradient of 36 is consistent with echocardiographic findings and valve area of 1.04 cm² is also consistent with the valve area calculated on the echocardiogram.  The ejection fraction is normal.  No evidence of ischemic damage.  He has an upcoming office visit, we will likely refer for elective revascularization of 80% proximal LAD and 70% proximal large first OM branch.  Treatment with rosuvastatin plus ezetimibe and has recently been started on metoprolol succinate 25 mg daily.

## 2025-02-10 ENCOUNTER — OFFICE VISIT (OUTPATIENT)
Dept: CARDIOLOGY | Facility: CLINIC | Age: 60
End: 2025-02-10
Payer: COMMERCIAL

## 2025-02-10 DIAGNOSIS — Z01.818 PRE-OPERATIVE CLEARANCE: ICD-10-CM

## 2025-02-10 DIAGNOSIS — I25.10 CORONARY ARTERIOSCLEROSIS: Primary | ICD-10-CM

## 2025-02-10 DIAGNOSIS — E78.2 MIXED HYPERLIPIDEMIA: ICD-10-CM

## 2025-02-10 PROCEDURE — 99214 OFFICE O/P EST MOD 30 MIN: CPT | Performed by: INTERNAL MEDICINE

## 2025-02-10 PROCEDURE — 1036F TOBACCO NON-USER: CPT | Performed by: INTERNAL MEDICINE

## 2025-02-10 PROCEDURE — RXMED WILLOW AMBULATORY MEDICATION CHARGE

## 2025-02-10 PROCEDURE — 3008F BODY MASS INDEX DOCD: CPT | Performed by: INTERNAL MEDICINE

## 2025-02-10 RX ORDER — ASPIRIN 81 MG/1
81 TABLET ORAL DAILY
COMMUNITY

## 2025-02-10 RX ORDER — ROSUVASTATIN CALCIUM 20 MG/1
40 TABLET, COATED ORAL DAILY
Qty: 180 TABLET | Refills: 0 | Status: SHIPPED | OUTPATIENT
Start: 2025-02-10

## 2025-02-10 ASSESSMENT — PATIENT HEALTH QUESTIONNAIRE - PHQ9
1. LITTLE INTEREST OR PLEASURE IN DOING THINGS: NOT AT ALL
2. FEELING DOWN, DEPRESSED OR HOPELESS: NOT AT ALL
SUM OF ALL RESPONSES TO PHQ9 QUESTIONS 1 AND 2: 0

## 2025-02-10 ASSESSMENT — PAIN SCALES - GENERAL: PAINLEVEL_OUTOF10: 0-NO PAIN

## 2025-02-11 PROCEDURE — RXMED WILLOW AMBULATORY MEDICATION CHARGE

## 2025-02-12 ENCOUNTER — TELEPHONE (OUTPATIENT)
Dept: CARDIOLOGY | Facility: CLINIC | Age: 60
End: 2025-02-12
Payer: COMMERCIAL

## 2025-02-12 ENCOUNTER — APPOINTMENT (OUTPATIENT)
Dept: CARDIOLOGY | Facility: CLINIC | Age: 60
End: 2025-02-12
Payer: COMMERCIAL

## 2025-02-12 ENCOUNTER — PHARMACY VISIT (OUTPATIENT)
Dept: PHARMACY | Facility: CLINIC | Age: 60
End: 2025-02-12
Payer: COMMERCIAL

## 2025-02-12 VITALS
WEIGHT: 230.8 LBS | HEIGHT: 73 IN | DIASTOLIC BLOOD PRESSURE: 75 MMHG | SYSTOLIC BLOOD PRESSURE: 125 MMHG | BODY MASS INDEX: 30.59 KG/M2 | HEART RATE: 72 BPM

## 2025-02-12 PROCEDURE — RXMED WILLOW AMBULATORY MEDICATION CHARGE

## 2025-02-12 RX ORDER — CLOPIDOGREL BISULFATE 75 MG/1
75 TABLET ORAL DAILY
Qty: 30 TABLET | Refills: 11 | Status: SHIPPED | OUTPATIENT
Start: 2025-02-12 | End: 2026-02-12

## 2025-02-12 NOTE — PROGRESS NOTES
Subjective:  Patient returns for a postprocedure follow-up.  He is a pleasant 59-year-old gentleman with hyperlipidemia.  He also had remote chestradiation therapy for Burkitt's lymphoma in addition to chemotherapy.    He underwent a recent catheterization for aortic stenosis/CAD/possible pericardial constriction.  Catheterization did reveal a mild ostial left main lesion as well as a severe proximal LAD lesion.  There was also a high-grade OM1 lesion.  His aortic valve could not be crossed so we could not assess via hemodynamics for constriction.    He thus underwent a cardiac MRI  which showed no pericardial constriction.  He did have moderate aortic stenosis.    He continues to be limited by some exertional dyspnea and activity intolerance.  He remains appropriately concerned about this.  After discussion with his primary cardiologist, plan was to proceed with LAD stenting to improve his symptomatology and defer treatment of his aortic valve stenosis for as long as possible.  He comes in today to discuss the procedure.    He denies any rest symptoms and denies any severe or prolonged episodes of dyspnea    Objective:  General: Alert, usual pleasant self.  HEENT: Unchanged.  Lungs: Clear with good air exchange.  Cardiac: 2-3/6 systolic murmur.  Abdomen: Nontender.  Extremities: No edema.  Skin: No acute rash.  Neuro: Grossly intact and unchanged.  Vascular: Well-healed right femoral cath site.  Right radial pulse is easily palpable.    Impression/plan:  Gopal remains limited by some exertional dyspnea which I do suspect is probably an anginal equivalent.  I do think it is quite reasonable to proceed with LAD stenting and defer aortic valve intervention pending his response to revascularization.  I probably will defer on OM branch intervention given the relatively small caliber of this vessel.    I will plan on checking some preprocedure blood work in a week or 2, and we will recheck a lipid panel on combination  therapy at that time.    I will get him started on Plavix in addition to his aspirin.  I will tentatively plan on proceeding with his procedure on Tuesday, 2/25/2025 at the [facility].

## 2025-02-14 ENCOUNTER — PHARMACY VISIT (OUTPATIENT)
Dept: PHARMACY | Facility: CLINIC | Age: 60
End: 2025-02-14
Payer: COMMERCIAL

## 2025-02-19 DIAGNOSIS — Z01.818 PRE-OPERATIVE CLEARANCE: ICD-10-CM

## 2025-02-21 LAB
ANION GAP SERPL CALCULATED.4IONS-SCNC: 10 MMOL/L (CALC) (ref 7–17)
BUN SERPL-MCNC: 16 MG/DL (ref 7–25)
BUN/CREAT SERPL: NORMAL (CALC) (ref 6–22)
CALCIUM SERPL-MCNC: 10.3 MG/DL (ref 8.6–10.3)
CHLORIDE SERPL-SCNC: 103 MMOL/L (ref 98–110)
CHOLEST SERPL-MCNC: 142 MG/DL
CHOLEST/HDLC SERPL: 3 (CALC)
CO2 SERPL-SCNC: 28 MMOL/L (ref 20–32)
CREAT SERPL-MCNC: 0.88 MG/DL (ref 0.7–1.3)
EGFRCR SERPLBLD CKD-EPI 2021: 100 ML/MIN/1.73M2
ERYTHROCYTE [DISTWIDTH] IN BLOOD BY AUTOMATED COUNT: 13.1 % (ref 11–15)
GLUCOSE SERPL-MCNC: 91 MG/DL (ref 65–99)
HCT VFR BLD AUTO: 45.9 % (ref 38.5–50)
HDLC SERPL-MCNC: 47 MG/DL
HGB BLD-MCNC: 15 G/DL (ref 13.2–17.1)
INR PPP: 1
LDLC SERPL CALC-MCNC: 76 MG/DL (CALC)
MCH RBC QN AUTO: 28.3 PG (ref 27–33)
MCHC RBC AUTO-ENTMCNC: 32.7 G/DL (ref 32–36)
MCV RBC AUTO: 86.6 FL (ref 80–100)
NONHDLC SERPL-MCNC: 95 MG/DL (CALC)
PLATELET # BLD AUTO: 267 THOUSAND/UL (ref 140–400)
PMV BLD REES-ECKER: 10.8 FL (ref 7.5–12.5)
POTASSIUM SERPL-SCNC: 5.1 MMOL/L (ref 3.5–5.3)
PROTHROMBIN TIME: 10.3 SEC (ref 9–11.5)
RBC # BLD AUTO: 5.3 MILLION/UL (ref 4.2–5.8)
SODIUM SERPL-SCNC: 141 MMOL/L (ref 135–146)
TRIGL SERPL-MCNC: 104 MG/DL
WBC # BLD AUTO: 6.6 THOUSAND/UL (ref 3.8–10.8)

## 2025-02-25 ENCOUNTER — HOSPITAL ENCOUNTER (OUTPATIENT)
Dept: CARDIOLOGY | Facility: HOSPITAL | Age: 60
Discharge: HOME | End: 2025-02-25
Payer: COMMERCIAL

## 2025-02-25 ENCOUNTER — HOSPITAL ENCOUNTER (OUTPATIENT)
Facility: HOSPITAL | Age: 60
Discharge: HOME | End: 2025-02-26
Attending: INTERNAL MEDICINE | Admitting: INTERNAL MEDICINE
Payer: COMMERCIAL

## 2025-02-25 DIAGNOSIS — R94.39 ABNORMAL RESULT OF OTHER CARDIOVASCULAR FUNCTION STUDY: ICD-10-CM

## 2025-02-25 DIAGNOSIS — Z95.5 STATUS POST INSERTION OF DRUG ELUTING CORONARY ARTERY STENT: ICD-10-CM

## 2025-02-25 DIAGNOSIS — I20.0 UNSTABLE ANGINA (MULTI): Primary | ICD-10-CM

## 2025-02-25 DIAGNOSIS — I25.84 CORONARY ATHEROSCLEROSIS DUE TO CALCIFIED CORONARY LESION (CODE): ICD-10-CM

## 2025-02-25 LAB
ACT BLD: 218 SEC (ref 83–199)
ACT BLD: 305 SEC (ref 83–199)
ACT BLD: NORMAL S

## 2025-02-25 PROCEDURE — C1887 CATHETER, GUIDING: HCPCS | Performed by: INTERNAL MEDICINE

## 2025-02-25 PROCEDURE — 92978 ENDOLUMINL IVUS OCT C 1ST: CPT | Mod: LD | Performed by: INTERNAL MEDICINE

## 2025-02-25 PROCEDURE — 93010 ELECTROCARDIOGRAM REPORT: CPT | Performed by: INTERNAL MEDICINE

## 2025-02-25 PROCEDURE — 7100000010 HC PHASE TWO TIME - EACH INCREMENTAL 1 MINUTE: Performed by: INTERNAL MEDICINE

## 2025-02-25 PROCEDURE — C1874 STENT, COATED/COV W/DEL SYS: HCPCS | Performed by: INTERNAL MEDICINE

## 2025-02-25 PROCEDURE — C1725 CATH, TRANSLUMIN NON-LASER: HCPCS | Performed by: INTERNAL MEDICINE

## 2025-02-25 PROCEDURE — 2500000002 HC RX 250 W HCPCS SELF ADMINISTERED DRUGS (ALT 637 FOR MEDICARE OP, ALT 636 FOR OP/ED): Performed by: NURSE PRACTITIONER

## 2025-02-25 PROCEDURE — C9600 PERC DRUG-EL COR STENT SING: HCPCS | Mod: LD | Performed by: INTERNAL MEDICINE

## 2025-02-25 PROCEDURE — 93005 ELECTROCARDIOGRAM TRACING: CPT

## 2025-02-25 PROCEDURE — 99153 MOD SED SAME PHYS/QHP EA: CPT | Performed by: INTERNAL MEDICINE

## 2025-02-25 PROCEDURE — 92928 PRQ TCAT PLMT NTRAC ST 1 LES: CPT | Performed by: INTERNAL MEDICINE

## 2025-02-25 PROCEDURE — 7100000011 HC EXTENDED STAY RECOVERY HOURLY - NURSING UNIT

## 2025-02-25 PROCEDURE — 92978 ENDOLUMINL IVUS OCT C 1ST: CPT | Performed by: INTERNAL MEDICINE

## 2025-02-25 PROCEDURE — 99222 1ST HOSP IP/OBS MODERATE 55: CPT | Performed by: NURSE PRACTITIONER

## 2025-02-25 PROCEDURE — 2500000001 HC RX 250 WO HCPCS SELF ADMINISTERED DRUGS (ALT 637 FOR MEDICARE OP): Performed by: NURSE PRACTITIONER

## 2025-02-25 PROCEDURE — 2500000004 HC RX 250 GENERAL PHARMACY W/ HCPCS (ALT 636 FOR OP/ED): Performed by: INTERNAL MEDICINE

## 2025-02-25 PROCEDURE — C1760 CLOSURE DEV, VASC: HCPCS | Performed by: INTERNAL MEDICINE

## 2025-02-25 PROCEDURE — 2780000003 HC OR 278 NO HCPCS: Performed by: INTERNAL MEDICINE

## 2025-02-25 PROCEDURE — 2550000001 HC RX 255 CONTRASTS: Performed by: INTERNAL MEDICINE

## 2025-02-25 PROCEDURE — C1894 INTRO/SHEATH, NON-LASER: HCPCS | Performed by: INTERNAL MEDICINE

## 2025-02-25 PROCEDURE — 7100000009 HC PHASE TWO TIME - INITIAL BASE CHARGE: Performed by: INTERNAL MEDICINE

## 2025-02-25 PROCEDURE — 2500000004 HC RX 250 GENERAL PHARMACY W/ HCPCS (ALT 636 FOR OP/ED): Performed by: NURSE PRACTITIONER

## 2025-02-25 PROCEDURE — 85347 COAGULATION TIME ACTIVATED: CPT

## 2025-02-25 PROCEDURE — 99152 MOD SED SAME PHYS/QHP 5/>YRS: CPT | Performed by: INTERNAL MEDICINE

## 2025-02-25 PROCEDURE — C1769 GUIDE WIRE: HCPCS | Performed by: INTERNAL MEDICINE

## 2025-02-25 PROCEDURE — 96373 THER/PROPH/DIAG INJ IA: CPT | Performed by: INTERNAL MEDICINE

## 2025-02-25 PROCEDURE — C1753 CATH, INTRAVAS ULTRASOUND: HCPCS | Performed by: INTERNAL MEDICINE

## 2025-02-25 PROCEDURE — 2720000007 HC OR 272 NO HCPCS: Performed by: INTERNAL MEDICINE

## 2025-02-25 PROCEDURE — 2500000005 HC RX 250 GENERAL PHARMACY W/O HCPCS: Performed by: INTERNAL MEDICINE

## 2025-02-25 DEVICE — STENT ONYXNG27538UX ONYX 2.75X38RX
Type: IMPLANTABLE DEVICE | Site: CHEST | Status: FUNCTIONAL
Brand: ONYX FRONTIER™

## 2025-02-25 DEVICE — STENT ONYXNG30026UX ONYX 3.00X26RX
Type: IMPLANTABLE DEVICE | Site: CHEST | Status: FUNCTIONAL
Brand: ONYX FRONTIER™

## 2025-02-25 RX ORDER — MIDAZOLAM HYDROCHLORIDE 1 MG/ML
INJECTION, SOLUTION INTRAMUSCULAR; INTRAVENOUS AS NEEDED
Status: DISCONTINUED | OUTPATIENT
Start: 2025-02-25 | End: 2025-02-25 | Stop reason: HOSPADM

## 2025-02-25 RX ORDER — SODIUM CHLORIDE 9 MG/ML
100 INJECTION, SOLUTION INTRAVENOUS CONTINUOUS
Status: DISCONTINUED | OUTPATIENT
Start: 2025-02-25 | End: 2025-02-25

## 2025-02-25 RX ORDER — CLOPIDOGREL BISULFATE 75 MG/1
75 TABLET ORAL DAILY
Status: DISCONTINUED | OUTPATIENT
Start: 2025-02-26 | End: 2025-02-26 | Stop reason: HOSPADM

## 2025-02-25 RX ORDER — NAPROXEN SODIUM 220 MG/1
81 TABLET, FILM COATED ORAL ONCE
Status: DISCONTINUED | OUTPATIENT
Start: 2025-02-25 | End: 2025-02-25

## 2025-02-25 RX ORDER — HEPARIN SODIUM 1000 [USP'U]/ML
INJECTION, SOLUTION INTRAVENOUS; SUBCUTANEOUS AS NEEDED
Status: DISCONTINUED | OUTPATIENT
Start: 2025-02-25 | End: 2025-02-25 | Stop reason: HOSPADM

## 2025-02-25 RX ORDER — FENTANYL CITRATE 50 UG/ML
INJECTION, SOLUTION INTRAMUSCULAR; INTRAVENOUS AS NEEDED
Status: DISCONTINUED | OUTPATIENT
Start: 2025-02-25 | End: 2025-02-25 | Stop reason: HOSPADM

## 2025-02-25 RX ORDER — ASPIRIN 81 MG/1
81 TABLET ORAL DAILY
Status: DISCONTINUED | OUTPATIENT
Start: 2025-02-26 | End: 2025-02-26 | Stop reason: HOSPADM

## 2025-02-25 RX ORDER — OXYCODONE HYDROCHLORIDE 5 MG/1
5 TABLET ORAL EVERY 6 HOURS PRN
Status: DISCONTINUED | OUTPATIENT
Start: 2025-02-25 | End: 2025-02-26 | Stop reason: HOSPADM

## 2025-02-25 RX ORDER — ROSUVASTATIN CALCIUM 20 MG/1
40 TABLET, COATED ORAL DAILY
Status: DISCONTINUED | OUTPATIENT
Start: 2025-02-25 | End: 2025-02-26 | Stop reason: HOSPADM

## 2025-02-25 RX ORDER — ACETAMINOPHEN 650 MG/1
650 SUPPOSITORY RECTAL EVERY 6 HOURS PRN
Status: DISCONTINUED | OUTPATIENT
Start: 2025-02-25 | End: 2025-02-26 | Stop reason: HOSPADM

## 2025-02-25 RX ORDER — CLOPIDOGREL BISULFATE 75 MG/1
75 TABLET ORAL ONCE
Status: DISCONTINUED | OUTPATIENT
Start: 2025-02-25 | End: 2025-02-25

## 2025-02-25 RX ORDER — SODIUM CHLORIDE 9 MG/ML
75 INJECTION, SOLUTION INTRAVENOUS CONTINUOUS
Status: ACTIVE | OUTPATIENT
Start: 2025-02-25 | End: 2025-02-26

## 2025-02-25 RX ORDER — ACETAMINOPHEN 325 MG/1
650 TABLET ORAL EVERY 6 HOURS PRN
Status: DISCONTINUED | OUTPATIENT
Start: 2025-02-25 | End: 2025-02-26 | Stop reason: HOSPADM

## 2025-02-25 RX ORDER — EZETIMIBE 10 MG/1
10 TABLET ORAL DAILY
Status: DISCONTINUED | OUTPATIENT
Start: 2025-02-25 | End: 2025-02-26 | Stop reason: HOSPADM

## 2025-02-25 RX ORDER — ONDANSETRON HYDROCHLORIDE 2 MG/ML
4 INJECTION, SOLUTION INTRAVENOUS EVERY 8 HOURS PRN
Status: DISCONTINUED | OUTPATIENT
Start: 2025-02-25 | End: 2025-02-26 | Stop reason: HOSPADM

## 2025-02-25 RX ORDER — ACETAMINOPHEN 160 MG/5ML
650 SOLUTION ORAL EVERY 6 HOURS PRN
Status: DISCONTINUED | OUTPATIENT
Start: 2025-02-25 | End: 2025-02-26 | Stop reason: HOSPADM

## 2025-02-25 RX ORDER — NITROGLYCERIN 40 MG/100ML
INJECTION INTRAVENOUS AS NEEDED
Status: DISCONTINUED | OUTPATIENT
Start: 2025-02-25 | End: 2025-02-25 | Stop reason: HOSPADM

## 2025-02-25 RX ORDER — METOPROLOL SUCCINATE 25 MG/1
25 TABLET, EXTENDED RELEASE ORAL DAILY
Status: DISCONTINUED | OUTPATIENT
Start: 2025-02-26 | End: 2025-02-26 | Stop reason: HOSPADM

## 2025-02-25 RX ORDER — LIDOCAINE HYDROCHLORIDE 20 MG/ML
INJECTION, SOLUTION INFILTRATION; PERINEURAL AS NEEDED
Status: DISCONTINUED | OUTPATIENT
Start: 2025-02-25 | End: 2025-02-25 | Stop reason: HOSPADM

## 2025-02-25 RX ORDER — ENOXAPARIN SODIUM 100 MG/ML
40 INJECTION SUBCUTANEOUS EVERY 24 HOURS
Status: DISCONTINUED | OUTPATIENT
Start: 2025-02-25 | End: 2025-02-26 | Stop reason: HOSPADM

## 2025-02-25 RX ORDER — CLOPIDOGREL BISULFATE 300 MG/1
300 TABLET, FILM COATED ORAL ONCE
Status: COMPLETED | OUTPATIENT
Start: 2025-02-25 | End: 2025-02-25

## 2025-02-25 RX ORDER — TALC
3 POWDER (GRAM) TOPICAL NIGHTLY PRN
Status: DISCONTINUED | OUTPATIENT
Start: 2025-02-25 | End: 2025-02-26 | Stop reason: HOSPADM

## 2025-02-25 RX ORDER — ONDANSETRON 4 MG/1
4 TABLET, ORALLY DISINTEGRATING ORAL EVERY 8 HOURS PRN
Status: DISCONTINUED | OUTPATIENT
Start: 2025-02-25 | End: 2025-02-26 | Stop reason: HOSPADM

## 2025-02-25 RX ADMIN — SODIUM CHLORIDE 100 ML/HR: 9 INJECTION, SOLUTION INTRAVENOUS at 09:32

## 2025-02-25 RX ADMIN — OXYCODONE HYDROCHLORIDE 5 MG: 5 TABLET ORAL at 15:52

## 2025-02-25 RX ADMIN — EZETIMIBE 10 MG: 10 TABLET ORAL at 20:42

## 2025-02-25 RX ADMIN — CLOPIDOGREL BISULFATE 300 MG: 300 TABLET, FILM COATED ORAL at 14:31

## 2025-02-25 RX ADMIN — SODIUM CHLORIDE 75 ML/HR: 9 INJECTION, SOLUTION INTRAVENOUS at 16:43

## 2025-02-25 RX ADMIN — ROSUVASTATIN 40 MG: 20 TABLET, FILM COATED ORAL at 20:42

## 2025-02-25 SDOH — SOCIAL STABILITY: SOCIAL INSECURITY: DOES ANYONE TRY TO KEEP YOU FROM HAVING/CONTACTING OTHER FRIENDS OR DOING THINGS OUTSIDE YOUR HOME?: NO

## 2025-02-25 SDOH — SOCIAL STABILITY: SOCIAL INSECURITY: ARE THERE ANY APPARENT SIGNS OF INJURIES/BEHAVIORS THAT COULD BE RELATED TO ABUSE/NEGLECT?: NO

## 2025-02-25 SDOH — SOCIAL STABILITY: SOCIAL INSECURITY: DO YOU FEEL ANYONE HAS EXPLOITED OR TAKEN ADVANTAGE OF YOU FINANCIALLY OR OF YOUR PERSONAL PROPERTY?: NO

## 2025-02-25 SDOH — SOCIAL STABILITY: SOCIAL INSECURITY: HAVE YOU HAD ANY THOUGHTS OF HARMING ANYONE ELSE?: NO

## 2025-02-25 SDOH — SOCIAL STABILITY: SOCIAL INSECURITY: DO YOU FEEL UNSAFE GOING BACK TO THE PLACE WHERE YOU ARE LIVING?: NO

## 2025-02-25 SDOH — ECONOMIC STABILITY: HOUSING INSECURITY: DO YOU FEEL UNSAFE GOING BACK TO THE PLACE WHERE YOU LIVE?: NO

## 2025-02-25 SDOH — SOCIAL STABILITY: SOCIAL INSECURITY
ASK PARENT OR GUARDIAN: ARE THERE TIMES WHEN YOU, YOUR CHILD(REN), OR ANY MEMBER OF YOUR HOUSEHOLD FEEL UNSAFE, HARMED, OR THREATENED AROUND PERSONS WITH WHOM YOU KNOW OR LIVE?: NO

## 2025-02-25 SDOH — SOCIAL STABILITY: SOCIAL INSECURITY: HAVE YOU HAD THOUGHTS OF HARMING ANYONE ELSE?: YES

## 2025-02-25 SDOH — SOCIAL STABILITY: SOCIAL INSECURITY: WERE YOU ABLE TO COMPLETE ALL THE BEHAVIORAL HEALTH SCREENINGS?: YES

## 2025-02-25 SDOH — SOCIAL STABILITY: SOCIAL INSECURITY: ABUSE: ADULT

## 2025-02-25 SDOH — SOCIAL STABILITY: SOCIAL INSECURITY: HAS ANYONE EVER THREATENED TO HURT YOUR FAMILY OR YOUR PETS?: NO

## 2025-02-25 SDOH — SOCIAL STABILITY: SOCIAL INSECURITY: ARE YOU OR HAVE YOU BEEN THREATENED OR ABUSED PHYSICALLY, EMOTIONALLY, OR SEXUALLY BY ANYONE?: NO

## 2025-02-25 ASSESSMENT — ENCOUNTER SYMPTOMS
NEUROLOGICAL NEGATIVE: 1
GASTROINTESTINAL NEGATIVE: 1
ALLERGIC/IMMUNOLOGIC NEGATIVE: 1
MUSCULOSKELETAL NEGATIVE: 1
PSYCHIATRIC NEGATIVE: 1
SHORTNESS OF BREATH: 1
ENDOCRINE NEGATIVE: 1
EYES NEGATIVE: 1
CARDIOVASCULAR NEGATIVE: 1
HEMATOLOGIC/LYMPHATIC NEGATIVE: 1
CONSTITUTIONAL NEGATIVE: 1

## 2025-02-25 ASSESSMENT — COGNITIVE AND FUNCTIONAL STATUS - GENERAL
DAILY ACTIVITIY SCORE: 24
MOBILITY SCORE: 24
DAILY ACTIVITIY SCORE: 24
PATIENT BASELINE BEDBOUND: NO
MOBILITY SCORE: 24

## 2025-02-25 ASSESSMENT — PAIN SCALES - GENERAL
PAINLEVEL_OUTOF10: 2
PAINLEVEL_OUTOF10: 0 - NO PAIN
PAINLEVEL_OUTOF10: 0 - NO PAIN
PAINLEVEL_OUTOF10: 8
PAINLEVEL_OUTOF10: 8
PAINLEVEL_OUTOF10: 0 - NO PAIN
PAINLEVEL_OUTOF10: 8
PAINLEVEL_OUTOF10: 8

## 2025-02-25 ASSESSMENT — ACTIVITIES OF DAILY LIVING (ADL)
PATIENT'S MEMORY ADEQUATE TO SAFELY COMPLETE DAILY ACTIVITIES?: YES
ADEQUATE_TO_COMPLETE_ADL: YES
TOILETING: INDEPENDENT
JUDGMENT_ADEQUATE_SAFELY_COMPLETE_DAILY_ACTIVITIES: YES
FEEDING YOURSELF: INDEPENDENT
BATHING: INDEPENDENT
GROOMING: INDEPENDENT
LACK_OF_TRANSPORTATION: NO
HEARING - RIGHT EAR: FUNCTIONAL
DRESSING YOURSELF: INDEPENDENT
HEARING - LEFT EAR: FUNCTIONAL
WALKS IN HOME: INDEPENDENT

## 2025-02-25 ASSESSMENT — PAIN - FUNCTIONAL ASSESSMENT
PAIN_FUNCTIONAL_ASSESSMENT: 0-10

## 2025-02-25 ASSESSMENT — LIFESTYLE VARIABLES
HOW OFTEN DO YOU HAVE A DRINK CONTAINING ALCOHOL: NEVER
HOW OFTEN DO YOU HAVE 6 OR MORE DRINKS ON ONE OCCASION: NEVER
HOW MANY STANDARD DRINKS CONTAINING ALCOHOL DO YOU HAVE ON A TYPICAL DAY: PATIENT DOES NOT DRINK
SKIP TO QUESTIONS 9-10: 1
AUDIT-C TOTAL SCORE: 0
AUDIT-C TOTAL SCORE: 0

## 2025-02-25 ASSESSMENT — PATIENT HEALTH QUESTIONNAIRE - PHQ9
1. LITTLE INTEREST OR PLEASURE IN DOING THINGS: NOT AT ALL
2. FEELING DOWN, DEPRESSED OR HOPELESS: NOT AT ALL
SUM OF ALL RESPONSES TO PHQ9 QUESTIONS 1 & 2: 0

## 2025-02-25 ASSESSMENT — PAIN DESCRIPTION - LOCATION
LOCATION: HEAD
LOCATION: HEAD

## 2025-02-25 NOTE — Clinical Note
Inflation 1: Pressure = 20 melva; Duration = 18 sec. Inflation 2: Pressure = 20 melva; Duration = 15 sec. Inflation 3: Pressure = 20 melva; Duration = 20 sec.

## 2025-02-25 NOTE — Clinical Note
Inflation 1: Pressure = 8 melva; Duration = 15 sec. Inflation 2: Pressure = 8 melva; Duration = 15 sec. Inflation 3: Pressure = 20 melva; Duration = 10 sec. Inflation 4: Pressure = 20 melva; Duration = 15 sec.

## 2025-02-25 NOTE — H&P
History Of Present Illness  Gopal AICHA Blas is a 59 y.o. male presenting with CAD, here for PCI LAD. PMHx significant for CAD (C 24: pLAD 80%, mCx 60%, ostial Cx 30%, OM1 70%, RCA 40%), moderate aortic stenosis, Burkitt's lymphoma s/p thymectomy at age 10 and RT, Chemotherapy x 1 year, tonsillitis s/p tonsillectomy, HTN, former smoker.     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 Ortiz    CARPAL TUNNEL RELEASE  2016    Neuroplasty Decompression Median Nerve At Carpal Tunnel    CERVICAL FUSION      X 2    OTHER SURGICAL HISTORY  2014    Post Spinal Diskectomy, Osteophytectomy One Lumbar Interspac    OTHER SURGICAL HISTORY  2016    Hemilaminectomy With Disc Removal Lumbar Re-Exploration    OTHER SURGICAL HISTORY  2018    Excision Of Thymus          Social History:  Social History     Tobacco Use    Smoking status: Former     Current packs/day: 0.00     Average packs/day: 1 pack/day for 39.8 years (39.8 ttl pk-yrs)     Types: Cigarettes     Start date:      Quit date: 2024     Years since quittin.3    Smokeless tobacco: Never   Vaping Use    Vaping status: Never Used   Substance Use Topics    Alcohol use: Yes     Alcohol/week: 2.0 standard drinks of alcohol     Types: 2  Standard drinks or equivalent per week    Drug use: Yes     Frequency: 1.0 times per week     Types: Marijuana       Family History:  Family History   Problem Relation Name Age of Onset    Coronary artery disease Mother      Hypertension Mother      Hyperlipidemia Mother      No Known Problems Father          Allergies:  Allergies   Allergen Reactions    Simvastatin Other     Elevated LFTs    Tramadol Hcl Unknown        Home Medications:  Current Outpatient Medications   Medication Instructions    aspirin 81 mg, Daily    clopidogrel (PLAVIX) 75 mg, oral, Daily    ezetimibe (ZETIA) 10 mg, oral, Daily    HYDROcodone-acetaminophen (Norco) 5-325 mg tablet 1 tablet, oral, Every 6 hours PRN    metoprolol succinate XL (TOPROL-XL) 25 mg, oral, Daily, Do not crush or chew.    rosuvastatin (CRESTOR) 40 mg, oral, Daily       Inpatient Medications:  Scheduled medications   Medication Dose Route Frequency    aspirin  81 mg oral Once    clopidogrel  75 mg oral Once     PRN medications   Medication     Continuous Medications   Medication Dose Last Rate    sodium chloride 0.9%  100 mL/hr 100 mL/hr (02/25/25 0932)         Review of Systems   Constitutional: Negative.    HENT: Negative.     Eyes: Negative.    Respiratory:  Positive for shortness of breath.    Cardiovascular: Negative.    Gastrointestinal: Negative.    Endocrine: Negative.    Genitourinary: Negative.    Musculoskeletal: Negative.    Skin: Negative.    Allergic/Immunologic: Negative.    Neurological: Negative.    Hematological: Negative.    Psychiatric/Behavioral: Negative.            Physical Exam  General:  Patient is awake, alert, and oriented.  Patient is in no acute distress.  HEENT:  Pupils equal and reactive.  Normocephalic.  Moist mucosa.    Neck:  No JVD.   Cardiovascular:  Regular rate and rhythm.  Normal S1 and S2. +Murmur. Radial pulses 2+.   Pulmonary:  Clear to auscultation bilaterally.  Abdomen:  Soft. Non-tender.   Non-distended.  Positive bowel  "sounds.  Lower Extremities:  Pedal pulses 2+ No LE edema.  Neurologic:  Cranial nerves II-XII grossly intact.   No focal deficit.   Skin: Skin warm and dry, no lesions. Normal skin turgor.   Psychiatric: Normal affect.     Sedation Plan    ASA 3     Mallampati class: III.    Risks, benefits, and alternatives discussed with patient.         NPO since 0000    Last Recorded Vitals  Blood pressure 150/82, pulse 80, resp. rate 16, height 1.854 m (6' 1\"), weight 103 kg (228 lb), SpO2 99%.         Vitals from the Past 24 Hours  Heart Rate:  [80]   Resp:  [16]   BP: (150)/(82)   Height:  [185.4 cm (6' 1\")]   Weight:  [103 kg (228 lb)]   SpO2:  [99 %]          Relevant Results    Labs    POCT Glucose:      POCT Urine Pregnancy:       CBC:   Recent Labs     02/20/25  1205 12/11/24  0736 10/31/24  1159 01/29/24  0004 09/26/23  1055 02/17/22  1145   WBC 6.6 6.3 6.8 11.4* 8.9 8.8   HGB 15.0 14.3 15.8 15.1 17.0 16.5   HCT 45.9 43.1 47.5 45.0 52.3* 48.1    258 275 266 293 283   MCV 86.6 85 87 85 87 87     BMP/CMP:   Recent Labs     02/20/25  1205 12/11/24  0736 10/31/24  1159 08/29/24  0940 01/29/24  0004 10/05/23  1224 09/26/23  1055 02/17/22  1145    140 142 138 138 139 138 138   K 5.1 4.6 4.8 5.2 4.1 4.8 5.4* 4.6    108* 102 101 103 104 102 102   BUN 16 13 15 15 16 15 17 15   CREATININE 0.88 0.84 0.95 0.84 1.00 0.89 0.93 0.97   CO2 28 28 32 26 24 25 29 26   CALCIUM 10.3 9.1 10.8* 10.4 9.6 10.4 10.7* 10.5   PROT  --   --  7.7 7.5 7.1 7.4 8.0 7.7   BILITOT  --   --  0.5 0.5 <0.2 0.6 0.5 0.4   ALKPHOS  --   --  62 57 80 53 63 61   ALT  --   --  34 34 19 38 33 68*   AST  --   --  19 22 18 22 18 31   GLUCOSE 91 109* 94 96 130* 86 97 86      Magnesium:   Recent Labs     09/11/20  1155   MG 1.97     Lipid Panel:   Recent Labs     02/20/25  1207 10/31/24  1159 08/29/24  0940 02/17/22  1145 01/22/21  0852 09/11/20  1155 03/17/20  1212 07/03/18  1141   CHOL 142 188 183 241* 203* 224* 335* 291*   HDL 47 47.4 52.0 52.9 " "44.3 46.0 41.9 45.1   CHHDL 3.0 4.0 3.5 4.6 4.6 4.9 8.0* 6.5*   VLDL  --  23 21 51* 25 26 47* 28   TRIG 104 115 104 257* 123 132 235* 138   NHDL 95 141 131 188  --   --  293  --      Cardiac       No lab exists for component: \"CK\", \"CKMBP\"   Hemoglobin A1C:   Recent Labs     08/29/24  0940   HGBA1C 5.6     TSH/ Free T4:   Recent Labs     10/31/24  1159 08/29/24  0940 02/17/22  1145 09/11/20  1155 07/03/18  1141   TSH 1.17 1.36 1.98 1.27 1.31     Iron: No results for input(s): \"FERRITIN\", \"TIBC\", \"IRONSAT\", \"BNP\" in the last 50062 hours.  Coag:   Results from last 7 days   Lab Units 02/20/25  1205   [facility] INR  1.0     ABO: No results found for: \"ABO\"    Past Cardiology Tests (Last 3 Years):    EKG:  Recent Labs     11/13/24  1415 08/29/24  0820 02/20/23  1409   ATRRATE 97 76 85   VENTRATE 97 76 85   PRINT 124 128 122   QRSDUR 92 88 88   QTCFRED 387 408 406   QTCCALCB 419 425 430     Encounter Date: 11/13/24   ECG 12 Lead   Result Value    Ventricular Rate 97    Atrial Rate 97    WA Interval 124    QRS Duration 92    QT Interval 330    QTC Calculation(Bazett) 419    P Axis 81    R Axis 59    T Axis 269    QRS Count 16    Q Onset 215    P Onset 153    P Offset 204    T Offset 380    QTC Fredericia 387    Narrative    Normal sinus rhythm  Right atrial enlargement  Possible Inferior infarct , age undetermined  ST & T wave abnormality, consider lateral ischemia  Abnormal ECG  Confirmed by Madi Ortiz ([id number]) on 11/13/2024 3:58:18 PM     Echo:  Echocardiogram:   Transthoracic Echo (TTE) Complete With Contrast 09/19/2024    [facility], [address] [place], [place] [address]  Tel [phone] and Fax [phone]    TRANSTHORACIC ECHOCARDIOGRAM REPORT      Patient Name:      J LUIS CUI      Reading Physician:    [id number] Eduardo Stahl MD  Study Date:        9/19/2024            Ordering Provider:    [id number] EMERY SANABRIA  MRN/PID:           [id number]             Fellow:  Accession#:        " [id number]         Nurse:                Cordelia Villar  MSN, RN, CNOR,  RNFA  Date of Birth/Age: 1965 / 59 years Sonographer:          Manuel Cazares  [facility]  Gender:            M                    Additional Staff:  Height:            185.42 cm            Admit Date:  Weight:            100.70 kg            Admission Status:     Outpatient  BSA / BMI:         2.25 m2 / 29.29      Encounter#:           [id number]  kg/m2  Blood Pressure:    125/80 mmHg          Department Location:  [facility]    Study Type:    TRANSTHORACIC ECHO (TTE) COMPLETE  Diagnosis/ICD: Nonrheumatic aortic (valve) stenosis-I35.0; Heart disease,  unspecified-I51.9  Indication:    AS h/o chest radiation  CPT Code:      Echo Complete w Full Doppler-90033    Patient History:  Pertinent History: AS, Burkitt's lymphoma as a child, status post chest  radiation, OLIVIA, HLD, CAD.    Study Detail: The following Echo studies were performed: 2D, M-Mode, Doppler and  color flow. Technically challenging study due to body habitus,  prominent lung artifact and poor acoustic windows. Definity used  as a contrast agent for endocardial border definition. Total  contrast used for this procedure was 2.0 mL via IV push.      PHYSICIAN INTERPRETATION:  Left Ventricle: The left ventricular systolic function is normal, with a visually estimated ejection fraction of 60-65%. There are no regional left ventricular wall motion abnormalities. The left ventricular cavity size is normal. Spectral Doppler shows an impaired relaxation pattern of left ventricular diastolic filling.  Left Atrium: The left atrium is upper limits of normal in size.  Right Ventricle: The right ventricle is normal in size. There is normal right ventriclar wall thickness. There is normal right ventricular global systolic function.  Right Atrium: The right atrium is normal in size.  Aortic Valve: The aortic valve is trileaflet. There is mild aortic valve cusp calcification. There is  reduced systolic aortic valve leaflet excursion. There is evidence of mild to moderate aortic valve stenosis. The aortic valve dimensionless index is 0.38. There is mild aortic valve regurgitation. The peak instantaneous gradient of the aortic valve is 31.6 mmHg. The mean gradient of the aortic valve is 21.8 mmHg.  Mitral Valve: The mitral valve is normal in structure. There is normal mitral valve leaflet mobility. There is trace mitral valve regurgitation.  Tricuspid Valve: The tricuspid valve is structurally normal. There is normal tricuspid valve leaflet mobility. There is trace tricuspid regurgitation.  Pulmonic Valve: The pulmonic valve is structurally normal. There is no indication of pulmonic valve regurgitation.  Pericardium: There is no pericardial effusion noted.  Aorta: The aortic root is normal. The Ao Sinus is 3.40 cm. The Asc Ao is 2.80 cm. There is no dilatation of the aortic arch. There is no dilatation of the ascending aorta. There is no dilatation of the aortic root. There is plaque visualized in the ascending aorta, which is classified as a Grade 2 [mild (focal or diffuse) intimal thickening of 2-3 mm] atherosclerosis.  Pulmonary Artery: The pulmonary artery is normal in size. The estimated pulmonary artery pressure is normal.  Systemic Veins: The inferior vena cava appears normal in size.      CONCLUSIONS:  1. The left ventricular systolic function is normal, with a visually estimated ejection fraction of 60-65%.  2. Spectral Doppler shows an impaired relaxation pattern of left ventricular diastolic filling.  3. There is normal right ventricular global systolic function.  4. Trace mitral valve regurgitation.  5. Mild to moderate aortic valve stenosis.  6. Mild aortic valve regurgitation.  7. The peak instantaneous gradient of the aortic valve is 31.6 mmHg.  8. There is plaque visualized in the ascending aorta.    QUANTITATIVE DATA SUMMARY:    2D MEASUREMENTS:          Normal Ranges:  Ao Root d:        3.40 cm  (2.0-3.7cm)  LAs:             4.15 cm  (2.7-4.0cm)  IVSd:            0.78 cm  (0.6-1.1cm)  LVPWd:           0.79 cm  (0.6-1.1cm)  LVIDd:           4.74 cm  (3.9-5.9cm)  LVIDs:           3.42 cm  LV Mass Index:   54 g/m2  LVEDV Index:     34 ml/m2  LV % FS          27.8 %      LA VOLUME:                    Normal Ranges:  LA Vol A4C:        34.4 ml    (22+/-6mL/m2)  LA Vol A2C:        55.7 ml  LA Vol BP:         45.2 ml  LA Vol Index A4C:  15.3 ml/m2  LA Vol Index A2C:  24.8 ml/m2  LA Vol Index BP:   20.1 ml/m2  LA Area A4C:       13.8 cm2  LA Area A2C:       18.1 cm2  LA Major Axis A4C: 4.7 cm  LA Major Axis A2C: 5.0 cm  LA Vol A4C:        32.7 ml  LA Vol A2C:        52.7 ml  LA Vol Index BSA:  19.0 ml/m2      AORTA MEASUREMENTS:         Normal Ranges:  Ao Sinus, d:        3.40 cm (2.1-3.5cm)  Asc Ao, d:          2.80 cm (2.1-3.4cm)      LV SYSTOLIC FUNCTION BY 2D PLANIMETRY (MOD):  Normal Ranges:  EF-A4C View:    39 % (>=55%)  EF-A2C View:    46 %  EF-Biplane:     43 %  EF-Visual:      63 %  LV EF Reported: 63 %      LV DIASTOLIC FUNCTION:            Normal Ranges:  MV Peak E:             1.14 m/s   (0.7-1.2 m/s)  MV Peak A:             1.26 m/s   (0.42-0.7 m/s)  E/A Ratio:             0.90       (1.0-2.2)  MV e'                  0.075 m/s  (>8.0)  MV lateral e'          0.09 m/s  MV medial e'           0.06 m/s  MV A Dur:              91.34 msec  E/e' Ratio:            15.17      (<8.0)  MV DT:                 226 msec   (150-240 msec)      MITRAL VALVE:          Normal Ranges:  MV DT:        226 msec (150-240msec)      AORTIC VALVE:                      Normal Ranges:  AoV Vmax:                2.81 m/s  (<=1.7m/s)  AoV Peak P.6 mmHg (<20mmHg)  AoV Mean P.8 mmHg (1.7-11.5mmHg)  LVOT Max Michael:            1.04 m/s  (<=1.1m/s)  AoV VTI:                 64.91 cm  (18-25cm)  LVOT VTI:                24.90 cm  LVOT Diameter:           1.80 cm   (1.8-2.4cm)  AoV Area, VTI:            0.98 cm2  (2.5-5.5cm2)  AoV Area,Vmax:           0.95 cm2  (2.5-4.5cm2)  AoV Dimensionless Index: 0.38      RIGHT VENTRICLE:  TAPSE: 17.0 mm      TRICUSPID VALVE/RVSP:         Normal Ranges:  IVC Diam:             1.80 cm      PULMONIC VALVE:          Normal Ranges:  PV Max Michael:     1.2 m/s  (0.6-0.9m/s)  PV Max P.2 mmHg      AORTA:  Asc Ao Diam 2.81 cm      [id number] Eduardo Stahl MD  Electronically signed on 2024 at 8:43:05 AM        ** Final **    Ejection Fractions:  LV EF   Date/Time Value Ref Range Status   2024 10:28 AM 63 %      Cath:  Coronary Angiography:   Left & Right Heart Cath w Angiography & LV 2024    [facility], Cath Lab, [address] [place] [address]    Cardiovascular Catheterization Report    Patient Name:      J LUIS CUI      Performing Physician:  [id number] Ortiz MD  Study Date:        2024            Verifying Physician:   Jojo Ortiz MD  MRN/PID:           [id number]             Cardiologist/Co-Scrub:  Accession#:        [id number]         Ordering Provider:     [id number] EMERY SANABRIA  Date of Birth/Age: 1965 / 59 years Cardiologist:  Gender:            M                    Fellow:  Encounter#:        [id number]           Surgeon:      Study:            Aortography  Additional Study: Right and Left Heart Cath  Additional Study: Coronary Arteriogram      Indications:  J LUIS CUI is a 59 year old male who presents with anginal equivalent with positive coronary CTA. J LUIS CUI is a 59 year old male who presents with aortic stenosis, dyslipidemia, current smoker and remote chest radiation for lymphoma and an anginal equivalent chest pain assessment (i.e. dyspnea on exertion believed to be from ischemia) anginal equivalent with positive coronary CTA. Suspected coronary artery disease and valvular disease.    Appropriate Use Criteria:  Preoperative assessment before valvular surgery; AUC  score = 7. CT angiography demonstrates =50% lesions in more than one coronary territory with symptoms; AUC score = 7.  Medical History:  Stress test performed: No. CTA performed: Yes. CTA result: Obstructive CAD.  Agatston accessed: Yes. Agatston Score: 395.78. LVEF Assessed: Yes. LVEF = 60%.      Procedure Description:  After infiltration with 2% Lidocaine, the right femoral artery was cannulated with a modified Seldinger technique. Subsequently a 4 Icelandic sheath was placed retrograde in the right femoral artery. After infiltration of local anesthetic, the right antecubital vein was cannulated with a micropuncture technique. A 5 Icelandic sheath was placed in the vein. Selective coronary catheterization was performed using a 4 Fr catheter(s) exchanged over a guide wire to cannulate the coronary arteries. A JL 4.5 tip catheter was used for left coronary injections. A JR 4 tip catheter was used for right coronary injections.  Multiple injections of contrast were made into the left and right coronary arteries with angiograms recorded in multiple projections. A 4 Fr. pigtail catheter was positioned above the aortic valve. An aortogram was shot in the MARGI projection. The contrast dose was 25 ml injected at 13 ml/sec. After completion of the procedure, the arterial sheath was pulled and pressure was applied to the site. Post-procedure, the venous sheath was pulled and pressure was applied to the site.    Procedure Description Comments:  The aortic valve could not be crossed despite using a fixed core straight wire through a 4F angled pigtail catheter as well as a 4F JR4 catheter. Left ventricular hemodynamics could thus not be obtained.    Coronary Angiography:  The coronary circulation is right dominant.    Coronary Angiography Comments:  There is two-vessel and branch coronary artery disease in a right dominant system. The left main is short but unremarkable. The wraparound LAD has an eccentric and calcified 80% proximal  stenosis well before the first diagonal branch. The more distal wraparound LAD is free of high-grade disease although is somewhat small in caliber. The LCx has a mild ostial narrowing and there is a 60% mid LCX lesion before the second OM branch. The large first OM branch has a 70% proximal stenosis. The large dominant RCA has a 40% proximal lesion but no higher grade obstructive disease.      Left Main Coronary Artery:  The left main coronary artery is a normal caliber vessel. The left main arises normally from the left coronary sinus of Valsalva and bifurcates into the LAD and circumflex coronary arteries. The left main coronary artery showed no significant disease or stenosis greater than 30%.    Left Anterior Descending Coronary Artery Distribution:  The proximal left anterior descending coronary artery showed 80% stenosis.    Circumflex Coronary Artery Distribution:  The mid circumflex coronary artery showed 60% stenosis. An additional lesion, located at the ostial circumflex coronary artery, demonstrated 30% stenosis. The proximal 1st obtuse marginal branch showed 70% stenosis.    Right Coronary Artery Distribution:    The proximal right coronary artery showed 40% stenosis.      Left Ventriculography:  Aortography showed a normal thoracic aorta with 1-2+ aortic insufficiency.      Valve Findings:  1+ to 2+ aortic valve insufficiency was seen.    Coronary Lesion Summary:  Vessel       Stenosis   Vessel Segment  LAD        80% stenosis    proximal  Circumflex 60% stenosis      mid  Circumflex 30% stenosis     ostial  OM 1       70% stenosis    proximal  RCA        40% stenosis    proximal      Hemo Personnel:  +----------------------+---------+  Name                  Duty       +----------------------+---------+  Madi Ortiz MD 1  +----------------------+---------+      Hemodynamic Pressures:  Resting hemodynamics showed a mildly elevated PCW pressure of 18 mmHg with a low normal Emily  cardiac index of 2.5 L/min/mï¿½. The PVR is normal at 145.    The aortic valve could not be crossed to allow for left ventricular pressure recordings. Thus, simultaneous LV and RV pressures could not be obtained to assess for potential pericardial constriction.  +----+----------+---------+-------------+-------------+---+----+-------+-------+  SiteDate Time   Phase  Systolic mmHg  Diastolic  ED MeanA-Wave V-Wave                   Name                    mmHg     mmHmmHg mmHg   mmHg                                                      g                     +----+----------+---------+-------------+-------------+---+----+-------+-------+    AO 11/5/2024 AIR REST          129           68     95                      12:06:57                                                                      PM                                                          +----+----------+---------+-------------+-------------+---+----+-------+-------+    RA 11/5/2024 AIR REST                                9     12     12        12:20:59                                                                      PM                                                          +----+----------+---------+-------------+-------------+---+----+-------+-------+    RV 11/5/2024 AIR REST           42              12                          12:21:18                                                                      PM                                                          +----+----------+---------+-------------+-------------+---+----+-------+-------+    PW 11/5/2024 AIR REST                               18     23     23        12:21:45                                                                      PM                                                           +----+----------+---------+-------------+-------------+---+----+-------+-------+    PA 11/5/2024 AIR REST           42           18     28                      12:22:04                                                                      PM                                                          +----+----------+---------+-------------+-------------+---+----+-------+-------+        Oxygen Saturation %:  +-----------+----------+------------+  Sample SiteO2 Sat (%)HB (g/100ml)  +-----------+----------+------------+           FA        96        15.1  +-----------+----------+------------+           PA        71        15.1  +-----------+----------+------------+           FA        96        15.1  +-----------+----------+------------+           PA        71        15.1  +-----------+----------+------------+      Cardiac Outputs:  +----+---+---+      CI CO   +----+---+---+  [id number]  +----+---+---+      Vascular Resistance Calculated Values (Wood Units):  +---+----+  [id number]  +---+----+  [id number]   +---+----+      Complications:  No in-lab complications observed.    Cardiac Cath Post Procedure Notes:  Post Procedure Diagnosis: Two vessel and branch coronary artery disease in a  right dominant system.  Blood Loss:               Estimated blood loss during the procedure was 30 mls.  Specimens Removed:        Number of specimen(s) removed: none.    ____________________________________________________________________________________  CONCLUSIONS:  1. Two-vessel and branch coronary artery disease in a right dominant system.  2. Normal left ventricular ejection fraction by echocardiography.  3. Moderate aortic stenosis by echocardiography.    ICD 10 Codes:  Atherosclerotic heart disease of native coronary artery with other forms of angina pectoris-I25.118; Nonrheumatic aortic (valve) stenosis-I35.0    CPT Codes:  Right & Left Heart Cath  w/ventriculography/Coronary angio (RHC)([facility])-39533; Injection supravalvular aortography-59559; Moderate Sedation Services 1st additional 15 minutes patient >5 years-43140; Moderate Sedation Services 2nd additional 15 minutes patient >5 years-71774    [id number] Madi Ortiz MD  Performing Physician  Electronically signed by [id number] Madi Ortiz MD on 11/11/2024 at 9:57:59 AM          ** Final **    Right Heart Cath: No results found for this or any previous visit from the past 1800 days.    Stress Test:  Nuclear:No results found for this or any previous visit from the past 1800 days.    Metabolic Stress: No results found for this or any previous visit from the past 1800 days.    Cardiac Imaging:  Cardiac Scoring:   CT angio coronary art with heartflow if score >30% 09/24/2024    Addendum 9/25/2024  8:49 AM  Interpreted By:  Israel Silver,  ADDENDUM:  CT FFR results demonstrate positive CT FFR drop in the mid LAD and  circumflex artery corresponding to the areas of stenosis seen on CT  angiography. There is a CT FFR drop to 0.66 in the proximal/mid LAD.  There is a CT FFR drop in the proximal circumflex artery 2 0.7.    There are no flow limiting stenoses within the RCA distribution.    Signed by: Israel Silver 9/25/2024 8:49 AM    -------- ORIGINAL REPORT --------  Dictation workstation:   [id number]    Narrative  Interpreted By:  Israel Silver,  STUDY:  CT ANGIO CORONARY ART WITH HEARTFLOW IF SCORE >30%;  9/24/2024 1:39 pm    INDICATION:  Signs/Symptoms:Dyspnea on exertion, history of chest irradiation,  familial hypercholesterolemia.  Evaluate for ostial coronary artery  stenosis.    ,I25.10 Atherosclerotic heart disease of native coronary artery  without angina pectoris,I35.0 Nonrheumatic aortic (valve)  stenosis,E78.01 Familial hypercholesterolemia,I51.9 Heart disease,  unspecified,R73.01 Impaired fasting glucose    COMPARISON:  None.    ACCESSION NUMBER(S):  [id number]    ORDERING  CLINICIAN:  EMERY SANABRIA    TECHNIQUE:  Using multi-detector CT technology,  axial, sequential imaging with  prospective gating was performed of the chest following the  intravenous administration of 75 ML Omnipaque 350.  In addition,  CT-FFR analysis was also performed.    The patient was premedicated with   mg i.v metoprolol and 0.8 mg  sublingual nitroglycerin for heart rate control and coronary  dilation, respectively.    For optimization of anatomic evaluation, multiplanar reconstruction,  maximum intensity projections, and advanced 3-D off-line  postprocessing were performed on a dedicated stand-alone workstation  under the direct supervision of the interpreting physician.    FINDINGS:  The score and distribution of calcium in the coronary arteries is as  follows:    .71,  .28,  LCx 61.79,  RCA 0,    Total 395.78    POTENTIAL STUDY LIMITATIONS:  Contrast enhancement is limited due to  extensive left-sided chest wall collaterals..    CORONARY ARTERIES:    CORONARY ANATOMY:  There is normal origin of the coronary arteries.    LEFT MAIN CORONARY ARTERY:  The left main is normal sized vessel that  bifurcates into the LAD  and circumflex. There is moderate nonocclusive calcified plaque of  the proximal left main coronary artery.    LEFT ANTERIOR DESCENDING ARTERY:  The LAD is a normal size vessel that  wraps around the apex.  It gives rise to  1 acute diagonal branches.  There is moderate atherosclerotic calcified plaque of the LAD. There  is 40-60% stenosis the mid LAD.    LEFT CIRCUMFLEX ARTERY:  The LCX is a normal size vessel, which is  non-dominant.  It gives rise to  1 obtuse marginal branches.  There is moderate calcified atherosclerotic plaque.  There is noncalcified plaque within the proximal circumflex artery  resulting in the 50-75% narrowing. There is limited visualization of  the mid to distal circumflex and a distal occlusion of the circumflex  artery can not be excluded.    RAMUS  INTERMEDIUS:  The ramus is a normal sized vessel.  It gives rise to   obtuse marginal/diagonal branches.  There is no significant atherosclerotic change or stenotic disease.    RIGHT CORONARY ARTERY:  The RCA is a normal size vessel, which is  dominant .  It gives rise to a  conus branch,  jorge a branch, and  1 acute  marginal branches.  In its distal segment it bifurcates into the PDA  and PV branch. There is no significant atherosclerotic change or  stenotic disease.    CARDIAC CHAMBERS:  The cardiac chambers demonstrate normal atrioventricular and  ventriculoarterial concordance, and systemic and pulmonary venous  return.    LEFT ATRIUM:  Normal size (    RIGHT ATRIUM:  Normal size (  INTERATRIAL SEPTUM:  Intact.    LEFT VENTRICLE:  Normal size (  - cm)    RIGHT VENTRICLE:  Normal size (  - cm)    AORTIC VALVE:  The aortic valve is  trileaflet in morphology.  No calcifications.    MITRAL VALVE:  No thickening/calcification.    THORACIC AORTA:  The visualized thoracic aorta is normal in course, caliber, and  contour. There is moderate noncalcified atherosclerotic plaque of the  aortic arch and proximal arch vessels.    The aortic arch is not included on this examination.    PERICARDIUM:  There is a focus of calcification at the level of the mitral  annulus/lateral pericardium.    CHEST:  There are prominent mediastinal and chest wall collaterals consistent  with probable subclavian/axillary vein narrowing/disease. The chest  wall is normal. No significant lymphadenopathy or mass is seen in  limited images of the mediastinum. There are scattered mediastinal  lymph nodes are felt to be reactive/inflammatory in nature. Limited  imaging through the lungs reveals no gross abnormalities. No pleural  effusion or pneumothorax.      UPPER ABDOMEN:  Limited imaging through the upper abdomen reveals no abnormalities of  the visualized organs. Note is made of prominent juxta  phrenic/diaphragmatic collateral  vessels.    Impression  1.  There are extensive calcified plaque within the coronary arteries.  2. There is moderate atherosclerotic disease of the LAD with moderate  atherosclerotic plaque resulting in 40-60% narrowing.  3. There is mixed plaque within the proximal circumflex artery 50-75%  narrowing.  4. Pericardial calcifications consistent with the history of prior  chest radiation.  5. Prominent filling of chest wall collateral vessels consistent with  left innominate/axillary stenosis.      MACRO:  None    Signed by: Israel Silver 9/24/2024 10:07 PM  Dictation workstation:   [id number]    Cardiac MRI:   MR cardiac morphology and function w and wo IV contrast 01/17/2025    Narrative  Interpreted By:  Ashli Wheeler,  STUDY:  MR CARDIAC MORPHOLOGY AND FUNCTION W AND WO IV CONTRAST;  1/17/2025  9:13 am    INDICATION:  Signs/Symptoms:Aortic stenosis, CAD, rule out pericardial  constriction..   This study is performed to assess myocardial  viability and damage, and to quantitate left ventricular and valvular  function.    ,I31.1 Chronic constrictive pericarditis (HHS-HCC),I35.0 Nonrheumatic  aortic (valve) stenosis,I25.10 Atherosclerotic heart disease of  native coronary artery without angina pectoris    COMPARISON:  None.    ACCESSION NUMBER(S):  [id number]    ORDERING CLINICIAN:  EMERY SANABRIA    TECHNIQUE:  Siemens 1.5 Genoveva MRI scanner.  Turbo spin echo and balanced steady state free precession (bSSFP)  imaging for anatomic definition.    Flow quantification sequences for hemodynamics.  Delayed gadolinium enhancement analysis after injection of 40 ML  Dotarem.    HT-185.4 cm; WT-104.3 kg;    FINDINGS:  CARDIAC CHAMBERS  Normal atrioventricular and ventriculoarterial concordance    LEFT ATRIUM  Normal size    RIGHT ATRIUM  Normal size    INTERATRIAL SEPTUM  Intact.    LEFT VENTRICLE  The left ventricle is normal in size and shape, and has normal global  systolic function. LVEF 55.0% there are no  "segmental wall motion  abnormalities. Quantitative left ventricular functional values are as  follows: EDV = 160.1 cc; EDVi = 69.3 cc/m2  ESV = 72 cc; ESVi = 31.1 cc/m2  Stroke volume = 88.1 cc; SVi = 38.1 cc/m2  LVEF = 55 %  Absolute Cardiac Output = 6.27 l/min.; COi = 0.71 l/min/m2  LV mass = 106.3 gm; LVMi = 57.5 gm/m2    *Justyn HENDERSON et al. Normalized left ventricular systolic and diastolic  function by steady state free precession cardiovascular magnetic  resonance. J Cardiovasc Magn Reson 2006; 8:417-26.    Delayed-enhancement imaging reveals uniformly \"nulled\" myocardium,  signifying that there has been no prior ischemic myocardial damage.  There is also no definite evidence of interstitial fibrosis to  suggest an infiltrative process.    RIGHT VENTRICLE  The right ventricle appears normal in size, shape, and has normal  qualitative systolic function. RV EF 52.7%. No segmental wall motion  abnormalities. No abnormal delayed enhancement in the myocardium.    INTERVENTRICULAR SEPTUM  Intact.    AORTIC VALVE  There is aortic stenosis. AMBER 1.04 cm2. Maximal velocity across the  aortic valve 300.1 cm/s with pressure gradient of 36.0 mm Hg. There  is  mild aortic regurgitation. Flow quantification through the  ascending aorta: Forward volume =76.8 cc/beat  Reverse volume = 5.48 cc/beat  Net forward volume = 71.3 cc/beat  Aortic regurgitant fraction = 7.1 %    MITRAL VALVE  There is  no mitral regurgitation.    TRICUSPID VALVE  There is qualitative no tricuspid regurgitation.    THORACIC AORTA  The thoracic aorta appears normal in course, caliber, and contour.  There is no evidence for acute aortic pathology. The arch vessel  branching pattern is  normal.    PULMONARY ARTERIES  The central pulmonary arteries appear  normal    SYSTEMIC AND PULMONARY VEINS  Normal systemic venous and pulmonary venous return.  The SVC and IVC are of normal caliber.  Normal pulmonary venous anatomy.    CHEST  The chest wall is " normal.  No significant lymphadenopathy or mass is seen in limited images of  the mediastinum. Limited imaging through the lungs reveals no gross  abnormalities. No pleural effusion.    UPPER ABDOMEN  Limited imaging through the upper abdomen reveals no abnormalities of  the visualized organs.    Impression  1. The left ventricle is normal in size, shape, and has normal global  systolic function. LVEF 55%. There are no segmental wall motion  abnormalities.  Quantitative values are as noted above.  2. There are no findings to suggest prior ischemic damage or an  infiltrative process.  3. Pericardium is not thickened. No evidence of septal bouncing to  suggest constrictive physiology. No MR evidence of pericardial  constriction.  4. There is aortic stenosis. AMBER 1.04 cm2. Maximal velocity across  the aortic valve 3.0 m/s with pressure gradient of 36.0 mm Hg. Mild  aortic regurgitation.      MACRO:  None    Signed by: Ashli Travis 1/17/2025 9:57 AM  Dictation workstation:   [id number]         Assessment/Plan  Assessment/Plan   Assessment & Plan  Unstable angina (Multi)        -Planned LHC/PCI LAD with Dr. Ortiz.   -ASA 81 mg PO and Plavix 75 mg PO taken today pre-procedure       NP discussed with Dr. Ortiz regarding plan of care/ discharge plan      Addendum:   -PCI, NANCY x2 prox to mid LAD. Plan to keep overnight for observation due to complexity of the procedure per Dr. Ortiz and Dr. Gillombardo  -Given Clopidogrel 300 mg PO post procedure, continue with DAPT: ASA 81 mg daily, Clopidogrel 75 mg daily   -Continue Rosuvastatin 20 mg daily, Ezetimibe 10 mg daily. Consideration for addition of PCSK9i at follow up visit with Dr. Potter.   -Follow up with Dr. Potter in 1-2 weeks  -Likely discharge in AM pending clinical course  -Cardiac Rehab referral     I spent 30 minutes in the professional and overall care of this patient.      Roxana Tamez, MARY BETH-CNP

## 2025-02-25 NOTE — Clinical Note
Angioplasty of the left anterior descending lesion. Inflation 1: Pressure = 8 melva; Duration = 20 sec.

## 2025-02-25 NOTE — CARE PLAN
The patient's goals for the shift include      The clinical goals for the shift include monitor bleeding

## 2025-02-25 NOTE — DISCHARGE INSTRUCTIONS
CARDIAC CATHETERIZATION DISCHARGE INSTRUCTIONS    You have been referred to cardiac rehab which is a medically supervised program to help improve your heart health. Cardiac rehab staff will contact you with additional information.     Please follow up with Dr. Potter in 1-2 weeks. Appointment requested. Please call the office if you do not hear anything in 3 business days        FOR SUDDEN AND SEVERE CHEST PAIN, SHORTNESS OF BREATH, EXCESSIVE BLEEDING, SIGNS OF STROKE, OR CHANGES IN MENTAL STATUS YOU SHOULD CALL 911 IMMEDIATELY.     If your provider has prescribed aspirin and/or clopidogrel (Plavix), or prasugrel (Effient), or ticagrelor (Brilinta), DO NOT STOP THESE MEDICATIONS for any reason without talking to your cardiologist first. If any of these were prescribed, you must take them every day without missing a single dose. If you are getting low on these medications, contact your provider immediately for a refill.     FOR NEXT 24 HOURS  - Upon discharge, you should return home and rest for the remainder of the day and evening. You do not have to stay on bed rest but should not be very active.  It is recommended a responsible adult be with you for the first 24 hours after the procedure.    - No driving for 24 hours after procedure. Please arrange for someone to drive you home from the hospital today.     - Do not drive, operate machinery, or use power tools for 24 hours after your procedure.     - Do not make any legal decisions for 24 hours after your procedure.     - Do not drink alcoholic beverages for 24 hours after your procedure.    -Stay extra hydrated for 24 hours after your procedure; goal fluid intake around 72-96 ounces for the next 24 hours.       WOUND CARE   *FOR FEMORAL (LEG) ACCESS*  ·      Avoid heavy lifting (over 10 pounds) for 7 days, squatting or excessive bending for 2 days, and strenuous exercise for 7 days.  ·      No submerged bathing, swimming, or hot tubs for the next 7 days,  or until fully healed.  ·      Avoid sexual activity for 3-4 days until any groin discomfort has ceased.     *FOR RADIAL (WRIST) ACCESS*  ·      No lifting more than 5 pounds or excessive use of the wrist for 24 hours - for example, treat your wrist as if it is sprained.  ·      Do not engage in vigorous activities (tennis, golf, bowling, weights) for at least 48 hours after the procedure.  ·      Do not submerge the wrist for 7 days after the procedure.  ·      You should expect mild tingling in your hand and tenderness at the puncture site for up to 3 days.    - The transparent dressing should be removed from the site 24 hours after the procedure.  Wash the site gently with soap and water. Rinse well and pat dry. Keep the area clean and dry. You may apply a Band-Aid to the site. Avoid lotions, ointments, or powders until fully healed.     - You may shower the day after your procedure.      - It is normal to notice a small bruise around the puncture site and/or a small grape sized or smaller lump. Any large bruising or large lump warrants a call to the office.     - If bleeding should occur, lay down and apply pressure to the affected area for 10 minutes.  If the bleeding stops notify your physician.  If there is a large amount of bleeding or spurting of blood CALL 911 immediately.  DO NOT drive yourself to the hospital.    - You may experience some tenderness, bruising or minimal inflammation.  If you have any concerns, you may contact the Cath Lab or if any of these symptoms become excessive, contact your cardiologist or go to the emergency room.     OTHER INSTRUCTIONS  - You may take acetaminophen (Tylenol) as directed for discomfort.  If pain is not relieved with acetaminophen (Tylenol), contact your doctor.    - If you notice or experience any of the following, you should notify your doctor or seek medical attention  Chest pain or discomfort  Change in mental status or weakness in extremities.  Dizziness,  light headedness, or feeling faint.  Change in the site where the procedure was performed, such as bleeding or an increased area of bruising or swelling.  Tingling, numbness, pain, or coolness in the leg/arm beyond the site where the procedure was performed.  Signs of infection (i.e. shaking chills, temperature > 100 degrees Fahrenheit, warmth, redness) in the leg/arm area where the procedure was performed.  Changes in urination   Bloody or black stools  Vomiting blood  Severe nose bleeds  Any excessive bleeding    - If you DO NOT have an appointment with your cardiologist within 2-4 weeks following your procedure, please contact their office.

## 2025-02-25 NOTE — POST-PROCEDURE NOTE
Physician Transition of Care Summary  Invasive Cardiovascular Lab    Procedure Date: 2/25/2025  Attending:    * Madi Ortiz - Primary  Resident/Fellow/Other Assistant: Surgeons and Role:     * Carl B Gillombardo, MD - Assisting    Indications:   Pre-op Diagnosis      * Unstable angina (Multi) [I20.0]    Post-procedure diagnosis:   Post-op Diagnosis     * Unstable angina (Multi) [I20.0]    Procedure(s):   PCI  37516 - MO PRQ TRLUML CORONARY STENT W/ANGIO ONE ART/BRNCH        Procedure Findings:   Severe single-vessel CAD involving the proximal through mid segments of the left anterior descending artery    Description of the Procedure:   6 Brazilian right radial artery access, hemostasis achieved using single TR band  Technically challenging case necessitating the use of multiple 6 Brazilian guide catheters including JL 3.5, JL 3.0 - - ultimately we were successful with a EBU 3.0  OCT guided PCI of the proximal through mid segments of the left anterior descending artery with a 2.75 mm x 38 mm Michelle frontier drug-eluting stent and 3.0 mm x 26 mm [place] frontier drug-eluting stent which were deployed in an overlapping fashion.    Complications:   None    Stents/Implants:   Implants       Stent    Stent, [place] Stokes Aldo, 2.75 X 38rx - [id number] - Implanted        Inventory item: STENT, MICHELLE FRONTIER ALDO, 2.75 X 38RX Model/Cat number: [id number]    : [employer] Lot number: [id number]    Device identifier: [id number]        GUDID Information       Request status Successful        Brand name: Michelle Stokes™ Version/Model: [id number]    Company name: [employer] MRI safety info as of 2/25/25: MR Conditional    Contains dry or latex rubber: No      GMDN P.T. name: Drug-eluting coronary artery stent, non-bioabsorbable-polymer-coated                As of 2/25/2025       Status: Implanted                      Stent, [place] Stokes Aldo, 3.00 X 26rx - [id number] - Implanted        Inventory item:  STENT, MICHELLE FRONTIER NANCY, 3.00 X 26RX Model/Cat number: [id number]    : [employer] Lot number: [id number]    Device identifier: [id number]        GUDID Information       Request status Successful        Brand name: [place]™ Version/Model: [id number]    Company name: [employer] MRI safety info as of 2/25/25: MR Conditional    Contains dry or latex rubber: No      GMDN P.T. name: Drug-eluting coronary artery stent, non-bioabsorbable-polymer-coated                As of 2/25/2025       Status: Implanted                              Anticoagulation/Antiplatelet Plan:   Periprocedural heparin, aspirin, Plavix  Aspirin and Plavix to be continued as an outpatient, with close follow-up in outpatient cardiology clinic with Dr. Ortiz    Estimated Blood Loss:   * No values recorded between 2/25/2025 11:43 AM and 2/25/2025  1:14 PM *    Anesthesia: Moderate Sedation Anesthesia Staff: No anesthesia staff entered.    Any Specimen(s) Removed:   No specimens collected during this procedure.    Disposition:   Will keep overnight for close observation given the extent of our procedure  Routine post-cath care of the 6 [place] right radial artery access site      Electronically signed by: Carl B Gillombardo, MD, 2/25/2025 1:14 PM

## 2025-02-26 VITALS
RESPIRATION RATE: 16 BRPM | BODY MASS INDEX: 30.22 KG/M2 | HEART RATE: 79 BPM | DIASTOLIC BLOOD PRESSURE: 75 MMHG | HEIGHT: 73 IN | TEMPERATURE: 98.1 F | OXYGEN SATURATION: 95 % | SYSTOLIC BLOOD PRESSURE: 135 MMHG | WEIGHT: 228 LBS

## 2025-02-26 DIAGNOSIS — Z98.61 POST PTCA: Primary | ICD-10-CM

## 2025-02-26 PROCEDURE — 7100000011 HC EXTENDED STAY RECOVERY HOURLY - NURSING UNIT

## 2025-02-26 PROCEDURE — 99239 HOSP IP/OBS DSCHRG MGMT >30: CPT | Performed by: NURSE PRACTITIONER

## 2025-02-26 PROCEDURE — 2500000005 HC RX 250 GENERAL PHARMACY W/O HCPCS: Performed by: NURSE PRACTITIONER

## 2025-02-26 PROCEDURE — 2500000001 HC RX 250 WO HCPCS SELF ADMINISTERED DRUGS (ALT 637 FOR MEDICARE OP): Performed by: NURSE PRACTITIONER

## 2025-02-26 RX ADMIN — ASPIRIN 81 MG: 81 TABLET, COATED ORAL at 08:54

## 2025-02-26 RX ADMIN — Medication 2 L/MIN: at 08:25

## 2025-02-26 RX ADMIN — CLOPIDOGREL 75 MG: 75 TABLET ORAL at 08:54

## 2025-02-26 RX ADMIN — METOPROLOL SUCCINATE 25 MG: 25 TABLET, EXTENDED RELEASE ORAL at 08:55

## 2025-02-26 SDOH — ECONOMIC STABILITY: HOUSING INSECURITY: AT ANY TIME IN THE PAST 12 MONTHS, WERE YOU HOMELESS OR LIVING IN A SHELTER (INCLUDING NOW)?: NO

## 2025-02-26 SDOH — ECONOMIC STABILITY: HOUSING INSECURITY: IN THE LAST 12 MONTHS, WAS THERE A TIME WHEN YOU WERE NOT ABLE TO PAY THE MORTGAGE OR RENT ON TIME?: NO

## 2025-02-26 SDOH — ECONOMIC STABILITY: FOOD INSECURITY: HOW HARD IS IT FOR YOU TO PAY FOR THE VERY BASICS LIKE FOOD, HOUSING, MEDICAL CARE, AND HEATING?: NOT HARD AT ALL

## 2025-02-26 SDOH — HEALTH STABILITY: PHYSICAL HEALTH: ON AVERAGE, HOW MANY MINUTES DO YOU ENGAGE IN EXERCISE AT THIS LEVEL?: 0 MIN

## 2025-02-26 SDOH — HEALTH STABILITY: PHYSICAL HEALTH: ON AVERAGE, HOW MANY DAYS PER WEEK DO YOU ENGAGE IN MODERATE TO STRENUOUS EXERCISE (LIKE A BRISK WALK)?: 0 DAYS

## 2025-02-26 SDOH — ECONOMIC STABILITY: TRANSPORTATION INSECURITY: IN THE PAST 12 MONTHS, HAS LACK OF TRANSPORTATION KEPT YOU FROM MEDICAL APPOINTMENTS OR FROM GETTING MEDICATIONS?: NO

## 2025-02-26 SDOH — ECONOMIC STABILITY: HOUSING INSECURITY: IN THE PAST 12 MONTHS, HOW MANY TIMES HAVE YOU MOVED WHERE YOU WERE LIVING?: 0

## 2025-02-26 ASSESSMENT — PAIN - FUNCTIONAL ASSESSMENT
PAIN_FUNCTIONAL_ASSESSMENT: 0-10

## 2025-02-26 ASSESSMENT — PAIN SCALES - GENERAL
PAINLEVEL_OUTOF10: 0 - NO PAIN

## 2025-02-26 ASSESSMENT — ACTIVITIES OF DAILY LIVING (ADL): LACK_OF_TRANSPORTATION: NO

## 2025-02-26 NOTE — CARE PLAN
Problem: Pain - Adult  Goal: Verbalizes/displays adequate comfort level or baseline comfort level  Outcome: Progressing     Problem: Safety - Adult  Goal: Free from fall injury  Outcome: Progressing     Problem: Discharge Planning  Goal: Discharge to home or other facility with appropriate resources  Outcome: Progressing     Problem: Chronic Conditions and Co-morbidities  Goal: Patient's chronic conditions and co-morbidity symptoms are monitored and maintained or improved  Outcome: Progressing     Problem: Nutrition  Goal: Nutrient intake appropriate for maintaining nutritional needs  Outcome: Progressing   The patient's goals for the shift include      The clinical goals for the shift include remains HDS

## 2025-02-26 NOTE — DISCHARGE SUMMARY
Discharge Diagnosis  Status post insertion of drug eluting coronary artery stent    Issues Requiring Follow-Up  -Cardiac Rehab referral.   -Follow up with Dr. Potter in 1-2 weeks. Consideration for initiation of PCSK9i at this time.  -Continue DAPT, statin.     Test Results Pending At Discharge  Pending Labs       No current pending labs.            Hospital Course  Gopal AICHA Blas is a 59 y.o. male with PMHx significant for CAD ([facility] 11/5/24: pLAD 80%, mCx 60%, ostial Cx 30%, OM1 70%, RCA 40%), moderate aortic stenosis, Burkitt's lymphoma s/p thymectomy at age 10 and RT, Chemotherapy x 1 year, tonsillitis s/p tonsillectomy, HTN, former smoker who presented for elective PCI LAD on 2/25/25 with Dr. Ortiz/Dr. Gillombardo.  Patient received 2 NANCY to prox to mid LAD. Due to complexity of procedure, he was placed under extended recovery for observation. VSS. No c/o cp, sob. SR on telemetry overnight. Right radial site with no bleeding, hematoma, ecchymosis, or swelling, 2+ radial pulses. He will continue on DAPT with ASA 81 mg daily, Clopidogrel 75 mg daily. He was reloaded with Clopidogrel 300  mg PO post procedure. Continue with statin, Zetia with consideration for possible initiation of PCSK9i as outpatient. He will follow up with Dr. Potter in the next 1-2 weeks. Cardiac Rehab referral placed. Discharge instructions and medications were discussed with patient. All questions and concerns addressed. Patient will call with any further questions or concerns after discharge. Discharge plan reviewed with Dr. Ortiz.           Pertinent Physical Exam At Time of Discharge  Physical Exam  General:  Patient is awake, alert, and oriented.  Patient is in no acute distress.  HEENT:  Pupils equal and reactive.  Normocephalic.  Moist mucosa.    Neck:  No JVD.   Cardiovascular:  Regular rate and rhythm.  Normal S1 and S2. No murmurs/rubs/gallops. Radial pulses 2+.   Pulmonary:  Clear to auscultation bilaterally.  Abdomen:   Soft. Non-tender.   Non-distended.  Positive bowel sounds.  Lower Extremities:  Pedal pulses 2+ No LE edema.  Neurologic:  Cranial nerves II-XII grossly intact.   No focal deficit.   Skin: Skin warm and dry, no lesions. Normal skin turgor. Right radial access site with no bleeding, swelling, hematoma, or drainage.      Psychiatric: Normal affect.    Home Medications     Medication List      CONTINUE taking these medications     aspirin 81 mg EC tablet   clopidogrel 75 mg tablet; Commonly known as: Plavix; Take 1 tablet (75   mg) by mouth once daily.   ezetimibe 10 mg tablet; Commonly known as: Zetia; Take 1 tablet (10 mg)   by mouth once daily.   metoprolol succinate XL 25 mg 24 hr tablet; Commonly known as:   Toprol-XL; Take 1 tablet (25 mg) by mouth once daily. Do not crush or   chew.   rosuvastatin 20 mg tablet; Commonly known as: Crestor; Take 2 tablets   (40 mg) by mouth once daily.     More than 30 minutes spent on preparation of discharge, coordination of care, and patient education.    Outpatient Follow-Up  No future appointments.    Roxana Tamez, APRN-CNP

## 2025-02-26 NOTE — PROGRESS NOTES
02/26/25 0929   Discharge Planning   Living Arrangements Spouse/significant other   Support Systems Spouse/significant other   Assistance Needed Independent, drives, works   Type of Residence Private residence   Number of Stairs to Enter Residence 2   Number of Stairs Within Residence 0   Do you have animals or pets at home? Yes   Type of Animals or Pets one dog   Who is requesting discharge planning? Provider   Home or Post Acute Services None   Expected Discharge Disposition Home   Does the patient need discharge transport arranged? Yes   RoundTrip coordination needed? Yes   Has discharge transport been arranged? No   Financial Resource Strain   How hard is it for you to pay for the very basics like food, housing, medical care, and heating? Not hard   Housing Stability   In the last 12 months, was there a time when you were not able to pay the mortgage or rent on time? N   In the past 12 months, how many times have you moved where you were living? 0   At any time in the past 12 months, were you homeless or living in a shelter (including now)? N   Transportation Needs   In the past 12 months, has lack of transportation kept you from medical appointments or from getting medications? no   In the past 12 months, has lack of transportation kept you from meetings, work, or from getting things needed for daily living? No   Patient Choice   Provider Choice list and CMS website (https://medicare.gov/care-compare#search) for post-acute Quality and Resource Measure Data were provided and reviewed with: Patient   Patient / Family choosing to utilize agency / facility established prior to hospitalization No   Stroke Family Assessment   Stroke Family Assessment Needed No   Intensity of Service   Intensity of Service 0-30 min          Met with patient at bedside and explained my role as care coordinator. He lives in the house with his wife. He is independent with his care at home. He drives and works. Patient denies use of  any ambulatory devices. No oxygen in use at home, no HD. His PCP is Dr. Gwen Collins and he seen him 5 months ago. Pharmacy he uses is [facility]. He is able to afford medications and to get to his doctors appointments. Patient had cardiac  catheterization done yesterday and had 2 stents placed. Patient denies any needs going home.    Patient is medically ready for discharge  They are being discharged to: Home  ____Wife______ will pick patient up    [facility]---N/A              Patient denies any other needs

## 2025-02-26 NOTE — CARE PLAN
Problem: Pain - Adult  Goal: Verbalizes/displays adequate comfort level or baseline comfort level  Outcome: Progressing     Problem: Safety - Adult  Goal: Free from fall injury  Outcome: Progressing     Problem: Discharge Planning  Goal: Discharge to home or other facility with appropriate resources  Outcome: Progressing     Problem: Chronic Conditions and Co-morbidities  Goal: Patient's chronic conditions and co-morbidity symptoms are monitored and maintained or improved  Outcome: Progressing     Problem: Nutrition  Goal: Nutrient intake appropriate for maintaining nutritional needs  Outcome: Progressing   The patient's goals for the shift include      The clinical goals for the shift include remains HDS    Over the shift, the patient did not make progress toward the following goals. Barriers to progression include \. Recommendations to address these barriers include .

## 2025-02-26 NOTE — PROGRESS NOTES
02/26/25 0929   [facility] Disability Status   Are you deaf or do you have serious difficulty hearing? N   Are you blind or do you have serious difficulty seeing, even when wearing glasses? N   Because of a physical, mental, or emotional condition, do you have serious difficulty concentrating, remembering, or making decisions? (5 years old or older) N   Do you have serious difficulty walking or climbing stairs? N   Do you have serious difficulty dressing or bathing? N   Because of a physical, mental, or emotional condition, do you have serious difficulty doing errands alone such as visiting the doctor? N

## 2025-03-03 LAB
ATRIAL RATE: 77 BPM
P AXIS: 76 DEGREES
P OFFSET: 201 MS
P ONSET: 151 MS
PR INTERVAL: 130 MS
Q ONSET: 216 MS
QRS COUNT: 13 BEATS
QRS DURATION: 86 MS
QT INTERVAL: 382 MS
QTC CALCULATION(BAZETT): 432 MS
QTC FREDERICIA: 415 MS
R AXIS: 43 DEGREES
T AXIS: 126 DEGREES
T OFFSET: 407 MS
VENTRICULAR RATE: 77 BPM

## 2025-03-14 DIAGNOSIS — Z95.5 STENTED CORONARY ARTERY: Primary | ICD-10-CM

## 2025-03-17 PROCEDURE — RXMED WILLOW AMBULATORY MEDICATION CHARGE

## 2025-03-19 ENCOUNTER — OFFICE VISIT (OUTPATIENT)
Dept: CARDIOLOGY | Facility: CLINIC | Age: 60
End: 2025-03-19
Payer: COMMERCIAL

## 2025-03-19 ENCOUNTER — HOSPITAL ENCOUNTER (OUTPATIENT)
Dept: CARDIOLOGY | Facility: CLINIC | Age: 60
Discharge: HOME | End: 2025-03-19
Payer: COMMERCIAL

## 2025-03-19 VITALS
BODY MASS INDEX: 30.75 KG/M2 | SYSTOLIC BLOOD PRESSURE: 120 MMHG | HEART RATE: 79 BPM | OXYGEN SATURATION: 95 % | HEIGHT: 73 IN | WEIGHT: 232 LBS | DIASTOLIC BLOOD PRESSURE: 69 MMHG

## 2025-03-19 DIAGNOSIS — I25.10 CORONARY ARTERY DISEASE, UNSPECIFIED VESSEL OR LESION TYPE, UNSPECIFIED WHETHER ANGINA PRESENT, UNSPECIFIED WHETHER NATIVE OR TRANSPLANTED HEART: Primary | ICD-10-CM

## 2025-03-19 DIAGNOSIS — F17.200 CURRENT EVERY DAY SMOKER: ICD-10-CM

## 2025-03-19 DIAGNOSIS — I25.118 ATHEROSCLEROTIC HEART DISEASE OF NATIVE CORONARY ARTERY WITH OTHER FORMS OF ANGINA PECTORIS: ICD-10-CM

## 2025-03-19 DIAGNOSIS — Z95.5 STATUS POST INSERTION OF DRUG ELUTING CORONARY ARTERY STENT: ICD-10-CM

## 2025-03-19 LAB
CHOLEST SERPL-MCNC: 140 MG/DL
CHOLEST/HDLC SERPL: 2.9 (CALC)
HDLC SERPL-MCNC: 48 MG/DL
LDLC SERPL CALC-MCNC: 69 MG/DL (CALC)
NONHDLC SERPL-MCNC: 92 MG/DL (CALC)
TRIGL SERPL-MCNC: 152 MG/DL

## 2025-03-19 PROCEDURE — 3008F BODY MASS INDEX DOCD: CPT | Performed by: INTERNAL MEDICINE

## 2025-03-19 PROCEDURE — 99214 OFFICE O/P EST MOD 30 MIN: CPT | Mod: 25 | Performed by: INTERNAL MEDICINE

## 2025-03-19 PROCEDURE — 93010 ELECTROCARDIOGRAM REPORT: CPT | Performed by: INTERNAL MEDICINE

## 2025-03-19 PROCEDURE — 93005 ELECTROCARDIOGRAM TRACING: CPT

## 2025-03-19 PROCEDURE — 99214 OFFICE O/P EST MOD 30 MIN: CPT | Performed by: INTERNAL MEDICINE

## 2025-03-19 PROCEDURE — 1036F TOBACCO NON-USER: CPT | Performed by: INTERNAL MEDICINE

## 2025-03-19 ASSESSMENT — ENCOUNTER SYMPTOMS
LOSS OF SENSATION IN FEET: 0
DEPRESSION: 0
OCCASIONAL FEELINGS OF UNSTEADINESS: 0

## 2025-03-19 ASSESSMENT — PAIN SCALES - GENERAL: PAINLEVEL_OUTOF10: 0-NO PAIN

## 2025-03-19 NOTE — PATIENT INSTRUCTIONS
You are doing well at the present time.  Attempt to restrict calories, and participate in phase 2 cardiac rehabilitation.  Hopefully, we will see modest weight loss over the next 3 months.  In 3 months, repeat a fasting lipid panel.  The order is in the electronic record.  Any  laboratory, fast for 10 hours.  Schedule a 4-month follow-up visit.

## 2025-03-19 NOTE — PROGRESS NOTES
Primary Care Physician: Gwen Collins MD  Date of Visit: 03/19/2025  4:20 PM EDT  Location of visit: [facility] [address]   Last office visit: 10/28/2024    Chief Complaint:     Follow-up PCI, elevated lipoprotein a    HPI/Summary  Gopal Blas is a 59 y.o. male who presents for followup cardiology evaluation.     The patient underwent chest radiation for Burkitt lymphoma on 8 7.  A calcium score in January, 2023 was 2010, with calcification in the left main and LAD.  An echocardiogram on March 2, 2023 showed moderate aortic stenosis, mild to moderate aortic regurgitation.  He presented in August, 2024 with exertional dyspnea, and we noted a family history of premature CAD, and longstanding tobacco abuse.  A repeat echocardiogram on September 19, 2024 showed mild to moderate aortic stenosis with a valve area of 0.98 cm² and a dimensionless index of 0.38.  Coronary CTA showed a 40 to 60% mid LAD and 50 to 75% proximal circumflex with extensive pericardial calcification.  CT FFR showed significant drop-off in the proximal to mid LAD and also in the proximal circumflex.  Laboratory studies showed a markedly elevated lipoprotein a, 79 mg/dL.  Cardiac catheterization on November 5, 2024 showed a 70 to 80% proximal LAD, a 70% marginal circumflex, normal cardiac index.  We then obtained cardiac MRI to exclude constrictive pericarditis.  The pericardium was not thickened, and there was no evidence of septal bounce to suggest constrictive physiology, but the study did confirm moderate aortic stenosis with a valve area of 1.04 cm² and mild aortic regurgitation.  No evidence of ischemic damage.    He then underwent PCI on February 25, 2025 with placement of 2 overlapping stents in the proximal to mid LAD.  Study was technically challenging, and the OM1 lesion was not addressed.  The OM was a relatively small caliber vessel.    Current regimen includes aspirin and clopidogrel, rosuvastatin increased to 40 mg daily, metoprolol  "succinate 25 mg daily and ezetimibe.  A lipid panel performed yesterday shows cholesterol 140, HDL 48, LDL 69, triglycerides 52.    Starting cardiac rehabilitation next week.  Feels fine.  Compliant with all medications.    Specialty Problems          Cardiology Problems    Status post insertion of drug eluting coronary artery stent    Unstable angina (Multi)    Abnormal ECG    Current every day smoker    HLD (hyperlipidemia)    Nonrheumatic aortic valve stenosis    Systolic murmur    Coronary arteriosclerosis    OLIVIA (dyspnea on exertion)    Elevated blood pressure reading    Abnormal EKG    Radiation-induced heart disease    Constrictive pericarditis (HHS-HCC)    Coronary artery disease    Atherosclerotic heart disease of native coronary artery with other forms of angina pectoris      Social History     Tobacco Use    Smoking status: Former     Current packs/day: 0.00     Average packs/day: 1 pack/day for 39.8 years (39.8 ttl pk-yrs)     Types: Cigarettes     Start date:      Quit date: 2024     Years since quittin.3    Smokeless tobacco: Never   Vaping Use    Vaping status: Never Used   Substance Use Topics    Alcohol use: Yes     Alcohol/week: 2.0 standard drinks of alcohol     Types: 2 Standard drinks or equivalent per week    Drug use: Yes     Frequency: 1.0 times per week     Types: Marijuana      Allergies   Allergen Reactions    Simvastatin Other     Elevated LFTs    Tramadol Hcl Unknown     Current Outpatient Medications   Medication Instructions    aspirin 81 mg, Daily    clopidogrel (PLAVIX) 75 mg, oral, Daily    ezetimibe (ZETIA) 10 mg, oral, Daily    metoprolol succinate XL (TOPROL-XL) 25 mg, oral, Daily, Do not crush or chew.    rosuvastatin (CRESTOR) 40 mg, oral, Daily       ROS    Vital Signs:  Vitals:    25 1633   BP: 120/69   BP Location: Left arm   Patient Position: Sitting   Pulse: 79   SpO2: 95%   Weight: 105 kg (232 lb)   Height: 1.854 m (6' 1\")     Wt Readings from Last 2 " "Encounters:   03/19/25 105 kg (232 lb)   02/25/25 103 kg (228 lb)     Body mass index is 30.61 kg/m².     Physical Exam:    Examination not performed today.     Lab Review:  CBC:  Lab Results   Component Value Date    WBC 6.6 02/20/2025    HGB 15.0 02/20/2025    HCT 45.9 02/20/2025    MCV 86.6 02/20/2025     02/20/2025       CMP:  Recent Labs     02/20/25  1205 12/11/24  0736 10/31/24  1159   GLUCOSE 91   < > 94      < > 142   K 5.1   < > 4.8      < > 102   CO2 28   < > 32   ANIONGAP 10   < > 13   BUN 16   < > 15   CREATININE 0.88   < > 0.95   EGFR 100   < > >90   ALBUMIN  --   --  5.1*   ALKPHOS  --   --  62   PROT  --   --  7.7   ALT  --   --  34   AST  --   --  19   BILITOT  --   --  0.5    < > = values in this interval not displayed.         LIPID PANEL:  Lab Results   Component Value Date    CHOL 140 03/18/2025    HDL 48 03/18/2025    CHHDL 2.9 03/18/2025    VLDL 23 10/31/2024    TRIG 152 (H) 03/18/2025    NHDL 92 03/18/2025       HEME/ENDO:  Lab Results   Component Value Date    HGBA1C 5.6 08/29/2024    TSH 1.17 10/31/2024         No results for input(s): \"BNP\" in the last 71350 hours.  Recent Cardiology Tests:    ECG:    ECG repeat today shows normal sinus rhythm and nonspecific T wave abnormality.  No significant changes compared to February 25, 2025.    Results for orders placed during the hospital encounter of 02/25/25    Electrocardiogram 12 Lead    Narrative  Normal sinus rhythm  Nonspecific T wave abnormality  Abnormal ECG  Confirmed by Madi Ortiz ([id number]) on 3/3/2025 4:16:33 PM       Echo:  Echo Results:  Transthoracic Echo (TTE) Complete With Contrast 09/19/2024    [facility], [address] [place] [address]  Tel [phone] and Fax [phone]    TRANSTHORACIC ECHOCARDIOGRAM REPORT      Patient Name:      J LUIS HOLCOMB BASILIA Mayfield Physician:    [id number] Eduardo Stahl MD  Study Date:        9/19/2024            Ordering Provider:    [id number] EMERY" AICHA SANABRIA  MRN/PID:           [id number]             Fellow:  Accession#:        [id number]         Nurse:                Cordelia Villar  MSN, RN, CNOR,  RNFA  Date of Birth/Age: 1965 / 59 years Sonographer:          Manuel Cazares  [facility]  Gender:            M                    Additional Staff:  Height:            185.42 cm            Admit Date:  Weight:            100.70 kg            Admission Status:     Outpatient  BSA / BMI:         2.25 m2 / 29.29      Encounter#:           [id number]  kg/m2  Blood Pressure:    125/80 mmHg          Department Location:  [facility]    Study Type:    TRANSTHORACIC ECHO (TTE) COMPLETE  Diagnosis/ICD: Nonrheumatic aortic (valve) stenosis-I35.0; Heart disease,  unspecified-I51.9  Indication:    AS h/o chest radiation  CPT Code:      Echo Complete w Full Doppler-30720    Patient History:  Pertinent History: AS, Burkitt's lymphoma as a child, status post chest  radiation, OLIVIA, HLD, CAD.    Study Detail: The following Echo studies were performed: 2D, M-Mode, Doppler and  color flow. Technically challenging study due to body habitus,  prominent lung artifact and poor acoustic windows. Definity used  as a contrast agent for endocardial border definition. Total  contrast used for this procedure was 2.0 mL via IV push.      PHYSICIAN INTERPRETATION:  Left Ventricle: The left ventricular systolic function is normal, with a visually estimated ejection fraction of 60-65%. There are no regional left ventricular wall motion abnormalities. The left ventricular cavity size is normal. Spectral Doppler shows an impaired relaxation pattern of left ventricular diastolic filling.  Left Atrium: The left atrium is upper limits of normal in size.  Right Ventricle: The right ventricle is normal in size. There is normal right ventriclar wall thickness. There is normal right ventricular global systolic function.  Right Atrium: The right atrium is normal in size.  Aortic Valve: The aortic  valve is trileaflet. There is mild aortic valve cusp calcification. There is reduced systolic aortic valve leaflet excursion. There is evidence of mild to moderate aortic valve stenosis. The aortic valve dimensionless index is 0.38. There is mild aortic valve regurgitation. The peak instantaneous gradient of the aortic valve is 31.6 mmHg. The mean gradient of the aortic valve is 21.8 mmHg.  Mitral Valve: The mitral valve is normal in structure. There is normal mitral valve leaflet mobility. There is trace mitral valve regurgitation.  Tricuspid Valve: The tricuspid valve is structurally normal. There is normal tricuspid valve leaflet mobility. There is trace tricuspid regurgitation.  Pulmonic Valve: The pulmonic valve is structurally normal. There is no indication of pulmonic valve regurgitation.  Pericardium: There is no pericardial effusion noted.  Aorta: The aortic root is normal. The Ao Sinus is 3.40 cm. The Asc Ao is 2.80 cm. There is no dilatation of the aortic arch. There is no dilatation of the ascending aorta. There is no dilatation of the aortic root. There is plaque visualized in the ascending aorta, which is classified as a Grade 2 [mild (focal or diffuse) intimal thickening of 2-3 mm] atherosclerosis.  Pulmonary Artery: The pulmonary artery is normal in size. The estimated pulmonary artery pressure is normal.  Systemic Veins: The inferior vena cava appears normal in size.      CONCLUSIONS:  1. The left ventricular systolic function is normal, with a visually estimated ejection fraction of 60-65%.  2. Spectral Doppler shows an impaired relaxation pattern of left ventricular diastolic filling.  3. There is normal right ventricular global systolic function.  4. Trace mitral valve regurgitation.  5. Mild to moderate aortic valve stenosis.  6. Mild aortic valve regurgitation.  7. The peak instantaneous gradient of the aortic valve is 31.6 mmHg.  8. There is plaque visualized in the ascending  aorta.    QUANTITATIVE DATA SUMMARY:    2D MEASUREMENTS:          Normal Ranges:  Ao Root d:       3.40 cm  (2.0-3.7cm)  LAs:             4.15 cm  (2.7-4.0cm)  IVSd:            0.78 cm  (0.6-1.1cm)  LVPWd:           0.79 cm  (0.6-1.1cm)  LVIDd:           4.74 cm  (3.9-5.9cm)  LVIDs:           3.42 cm  LV Mass Index:   54 g/m2  LVEDV Index:     34 ml/m2  LV % FS          27.8 %      LA VOLUME:                    Normal Ranges:  LA Vol A4C:        34.4 ml    (22+/-6mL/m2)  LA Vol A2C:        55.7 ml  LA Vol BP:         45.2 ml  LA Vol Index A4C:  15.3 ml/m2  LA Vol Index A2C:  24.8 ml/m2  LA Vol Index BP:   20.1 ml/m2  LA Area A4C:       13.8 cm2  LA Area A2C:       18.1 cm2  LA Major Axis A4C: 4.7 cm  LA Major Axis A2C: 5.0 cm  LA Vol A4C:        32.7 ml  LA Vol A2C:        52.7 ml  LA Vol Index BSA:  19.0 ml/m2      AORTA MEASUREMENTS:         Normal Ranges:  Ao Sinus, d:        3.40 cm (2.1-3.5cm)  Asc Ao, d:          2.80 cm (2.1-3.4cm)      LV SYSTOLIC FUNCTION BY 2D PLANIMETRY (MOD):  Normal Ranges:  EF-A4C View:    39 % (>=55%)  EF-A2C View:    46 %  EF-Biplane:     43 %  EF-Visual:      63 %  LV EF Reported: 63 %      LV DIASTOLIC FUNCTION:            Normal Ranges:  MV Peak E:             1.14 m/s   (0.7-1.2 m/s)  MV Peak A:             1.26 m/s   (0.42-0.7 m/s)  E/A Ratio:             0.90       (1.0-2.2)  MV e'                  0.075 m/s  (>8.0)  MV lateral e'          0.09 m/s  MV medial e'           0.06 m/s  MV A Dur:              91.34 msec  E/e' Ratio:            15.17      (<8.0)  MV DT:                 226 msec   (150-240 msec)      MITRAL VALVE:          Normal Ranges:  MV DT:        226 msec (150-240msec)      AORTIC VALVE:                      Normal Ranges:  AoV Vmax:                2.81 m/s  (<=1.7m/s)  AoV Peak P.6 mmHg (<20mmHg)  AoV Mean P.8 mmHg (1.7-11.5mmHg)  LVOT Max Michael:            1.04 m/s  (<=1.1m/s)  AoV VTI:                 64.91 cm  (18-25cm)  LVOT  VTI:                24.90 cm  LVOT Diameter:           1.80 cm   (1.8-2.4cm)  AoV Area, VTI:           0.98 cm2  (2.5-5.5cm2)  AoV Area,Vmax:           0.95 cm2  (2.5-4.5cm2)  AoV Dimensionless Index: 0.38      RIGHT VENTRICLE:  TAPSE: 17.0 mm      TRICUSPID VALVE/RVSP:         Normal Ranges:  IVC Diam:             1.80 cm      PULMONIC VALVE:          Normal Ranges:  PV Max Michael:     1.2 m/s  (0.6-0.9m/s)  PV Max P.2 mmHg      AORTA:  Asc Ao Diam 2.81 cm      [id number] Eduardo Stahl MD  Electronically signed on 2024 at 8:43:05 AM        ** Final **       Cath:      Stress Test:  Stress Results:  No results found for this or any previous visit from the past 365 days.         Cardiac Imaging:        Assessment/Plan   The patient is now status post successful LAD PCI.  He is no longer dyspneic with effort.  He has not yet lost any weight, but is anxious to begin phase 2 cardiac rehabilitation.  We have boosted rosuvastatin and he continues on ezetimibe.  Recent lipid panel was reasonable, LDL 68.  With an elevated lipoprotein a, we would like to see the LDL even lower but the patient is not anxious to begin more medications.  In the hospital, treatment with a PCSK9 inhibitor was discussed and will be considered.  The plan is for completion of phase 2 cardiac rehabilitation, attention to caloric intake and daily exercise, maintain current medication regimen and repeat lipid panel in 3 months, with a 4-month office visit.    Orders:  Orders Placed This Encounter   Procedures    Lipid Panel    ECG 12 lead (Clinic Performed)      Followup Appts:  Future Appointments   Date Time Provider Department Center   3/24/2025  1:00 PM COLLIN BARNES NURSE [facility]   3/26/2025  6:00 PM COLLIN TANGD CARDIAC REHAB CLASS [facility]   3/31/2025  6:00 PM COLLIN BARNES CARDIAC REHAB CLASS [facility]   2025  6:00 PM COLLIN BARNES CARDIAC REHAB CLASS [facility] [place]   2025  6:00 PM COLLIN BARNES CARDIAC REHAB CLASS [facility] [place]    4/9/2025  6:00 PM COLLIN BSD CARDIAC REHAB CLASS [facility]   4/14/2025  6:00 PM COLLIN BSD CARDIAC REHAB CLASS [facility]   4/16/2025  6:00 PM COLLIN BSD CARDIAC REHAB CLASS [facility]   4/21/2025  6:00 PM COLLIN BSD CARDIAC REHAB CLASS [facility]   4/23/2025  6:00 PM COLLIN BSD CARDIAC REHAB CLASS [facility]   4/28/2025  6:00 PM COLLIN BSD CARDIAC REHAB CLASS [facility]   4/30/2025  6:00 PM COLLIN BSD CARDIAC REHAB CLASS [facility]   5/5/2025  6:00 PM COLLIN BSD CARDIAC REHAB CLASS [facility]   5/7/2025  6:00 PM COLLIN BSD CARDIAC REHAB CLASS [facility]   5/12/2025  6:00 PM COLLIN BSD CARDIAC REHAB CLASS [facility]   5/14/2025  6:00 PM COLLIN BSD CARDIAC REHAB CLASS [facility]   5/19/2025  6:00 PM COLLIN BSD CARDIAC REHAB CLASS [facility]   5/21/2025  6:00 PM COLLIN BSD CARDIAC REHAB CLASS [facility]   5/28/2025  6:00 PM COLLIN BSD CARDIAC REHAB CLASS [facility]   6/2/2025  6:00 PM COLLIN BSD CARDIAC REHAB CLASS [facility]   6/4/2025  6:00 PM COLLIN BSD CARDIAC REHAB CLASS [facility]   6/9/2025  6:00 PM COLLIN BSD CARDIAC REHAB CLASS [facility]   6/11/2025  6:00 PM COLLIN BSD CARDIAC REHAB CLASS [facility]   6/16/2025  6:00 PM COLLIN BSD CARDIAC REHAB CLASS [facility]   6/18/2025  6:00 PM COLLIN BSD CARDIAC REHAB CLASS [facility]   6/23/2025  6:00 PM COLLIN BSD CARDIAC REHAB CLASS [facility]   6/25/2025  6:00 PM COLLIN BSD CARDIAC REHAB CLASS [facility]   6/30/2025  6:00 PM COLLIN BSD CARDIAC REHAB CLASS [facility]   7/2/2025  6:00 PM COLLIN BSD CARDIAC REHAB CLASS [facility]   7/7/2025  6:00 PM COLLIN BSD CARDIAC REHAB CLASS [facility]   7/9/2025  6:00 PM COLLIN BSD CARDIAC REHAB CLASS [facility]   7/14/2025  6:00 PM COLLIN BSD CARDIAC REHAB CLASS [facility]   7/16/2025  6:00 PM COLLIN BSD CARDIAC REHAB CLASS [facility]   7/21/2025  6:00 PM COLLIN BSD CARDIAC REHAB CLASS [facility]   7/23/2025  6:00 PM COLLIN BSD CARDIAC REHAB CLASS [facility]    7/28/2025  6:00 PM COLLIN BSD [facility]AB [facility]           ____________________________________________________________  Cal Potter MD    Senior Attending Physician  [facility]  [facility]    Marilyn [facility] Chair for Cardiovascular Excellence  [facility] [employer] [facility]

## 2025-03-21 ENCOUNTER — PHARMACY VISIT (OUTPATIENT)
Dept: PHARMACY | Facility: CLINIC | Age: 60
End: 2025-03-21
Payer: COMMERCIAL

## 2025-03-21 LAB
ATRIAL RATE: 78 BPM
P AXIS: 73 DEGREES
P OFFSET: 202 MS
P ONSET: 151 MS
PR INTERVAL: 130 MS
Q ONSET: 216 MS
QRS COUNT: 13 BEATS
QRS DURATION: 90 MS
QT INTERVAL: 382 MS
QTC CALCULATION(BAZETT): 435 MS
QTC FREDERICIA: 417 MS
R AXIS: 33 DEGREES
T AXIS: 123 DEGREES
T OFFSET: 407 MS
VENTRICULAR RATE: 78 BPM

## 2025-03-24 ENCOUNTER — CLINICAL SUPPORT (OUTPATIENT)
Dept: CARDIAC REHAB | Facility: CLINIC | Age: 60
End: 2025-03-24
Payer: COMMERCIAL

## 2025-03-24 DIAGNOSIS — Z98.61 POST PTCA: ICD-10-CM

## 2025-03-26 ENCOUNTER — CLINICAL SUPPORT (OUTPATIENT)
Dept: CARDIAC REHAB | Facility: CLINIC | Age: 60
End: 2025-03-26
Payer: COMMERCIAL

## 2025-03-26 DIAGNOSIS — Z95.5 STENTED CORONARY ARTERY: ICD-10-CM

## 2025-03-26 PROCEDURE — 93798 PHYS/QHP OP CAR RHAB W/ECG: CPT | Performed by: INTERNAL MEDICINE

## 2025-03-31 ENCOUNTER — APPOINTMENT (OUTPATIENT)
Dept: CARDIAC REHAB | Facility: CLINIC | Age: 60
End: 2025-03-31
Payer: COMMERCIAL

## 2025-03-31 DIAGNOSIS — Z95.5 STENTED CORONARY ARTERY: ICD-10-CM

## 2025-03-31 PROCEDURE — 93798 PHYS/QHP OP CAR RHAB W/ECG: CPT | Performed by: INTERNAL MEDICINE

## 2025-04-02 ENCOUNTER — APPOINTMENT (OUTPATIENT)
Dept: CARDIAC REHAB | Facility: CLINIC | Age: 60
End: 2025-04-02
Payer: COMMERCIAL

## 2025-04-07 ENCOUNTER — APPOINTMENT (OUTPATIENT)
Dept: CARDIAC REHAB | Facility: CLINIC | Age: 60
End: 2025-04-07
Payer: COMMERCIAL

## 2025-04-07 DIAGNOSIS — Z95.5 STENTED CORONARY ARTERY: ICD-10-CM

## 2025-04-09 ENCOUNTER — APPOINTMENT (OUTPATIENT)
Dept: CARDIAC REHAB | Facility: CLINIC | Age: 60
End: 2025-04-09
Payer: COMMERCIAL

## 2025-04-14 ENCOUNTER — APPOINTMENT (OUTPATIENT)
Dept: CARDIAC REHAB | Facility: CLINIC | Age: 60
End: 2025-04-14
Payer: COMMERCIAL

## 2025-04-14 DIAGNOSIS — Z95.5 STENTED CORONARY ARTERY: ICD-10-CM

## 2025-04-16 ENCOUNTER — APPOINTMENT (OUTPATIENT)
Dept: CARDIAC REHAB | Facility: CLINIC | Age: 60
End: 2025-04-16
Payer: COMMERCIAL

## 2025-04-21 ENCOUNTER — APPOINTMENT (OUTPATIENT)
Dept: CARDIAC REHAB | Facility: CLINIC | Age: 60
End: 2025-04-21
Payer: COMMERCIAL

## 2025-04-21 DIAGNOSIS — Z95.5 STENTED CORONARY ARTERY: ICD-10-CM

## 2025-04-23 ENCOUNTER — APPOINTMENT (OUTPATIENT)
Dept: CARDIAC REHAB | Facility: CLINIC | Age: 60
End: 2025-04-23
Payer: COMMERCIAL

## 2025-04-23 DIAGNOSIS — Z95.5 STENTED CORONARY ARTERY: ICD-10-CM

## 2025-04-28 ENCOUNTER — APPOINTMENT (OUTPATIENT)
Dept: CARDIAC REHAB | Facility: CLINIC | Age: 60
End: 2025-04-28
Payer: COMMERCIAL

## 2025-04-30 ENCOUNTER — APPOINTMENT (OUTPATIENT)
Dept: CARDIAC REHAB | Facility: CLINIC | Age: 60
End: 2025-04-30
Payer: COMMERCIAL

## 2025-05-05 ENCOUNTER — APPOINTMENT (OUTPATIENT)
Dept: CARDIAC REHAB | Facility: CLINIC | Age: 60
End: 2025-05-05
Payer: COMMERCIAL

## 2025-05-07 ENCOUNTER — APPOINTMENT (OUTPATIENT)
Dept: CARDIAC REHAB | Facility: CLINIC | Age: 60
End: 2025-05-07
Payer: COMMERCIAL

## 2025-05-12 ENCOUNTER — APPOINTMENT (OUTPATIENT)
Dept: CARDIAC REHAB | Facility: CLINIC | Age: 60
End: 2025-05-12
Payer: COMMERCIAL

## 2025-05-14 ENCOUNTER — APPOINTMENT (OUTPATIENT)
Dept: CARDIAC REHAB | Facility: CLINIC | Age: 60
End: 2025-05-14
Payer: COMMERCIAL

## 2025-05-19 ENCOUNTER — APPOINTMENT (OUTPATIENT)
Dept: CARDIAC REHAB | Facility: CLINIC | Age: 60
End: 2025-05-19
Payer: COMMERCIAL

## 2025-05-21 ENCOUNTER — APPOINTMENT (OUTPATIENT)
Dept: CARDIAC REHAB | Facility: CLINIC | Age: 60
End: 2025-05-21
Payer: COMMERCIAL

## 2025-05-28 ENCOUNTER — APPOINTMENT (OUTPATIENT)
Dept: CARDIAC REHAB | Facility: CLINIC | Age: 60
End: 2025-05-28
Payer: COMMERCIAL

## 2025-06-02 ENCOUNTER — APPOINTMENT (OUTPATIENT)
Dept: CARDIAC REHAB | Facility: CLINIC | Age: 60
End: 2025-06-02
Payer: COMMERCIAL

## 2025-06-04 ENCOUNTER — APPOINTMENT (OUTPATIENT)
Dept: CARDIAC REHAB | Facility: CLINIC | Age: 60
End: 2025-06-04
Payer: COMMERCIAL

## 2025-06-09 ENCOUNTER — APPOINTMENT (OUTPATIENT)
Dept: CARDIAC REHAB | Facility: CLINIC | Age: 60
End: 2025-06-09
Payer: COMMERCIAL

## 2025-06-11 ENCOUNTER — PHARMACY VISIT (OUTPATIENT)
Dept: PHARMACY | Facility: CLINIC | Age: 60
End: 2025-06-11
Payer: COMMERCIAL

## 2025-06-11 ENCOUNTER — APPOINTMENT (OUTPATIENT)
Dept: CARDIAC REHAB | Facility: CLINIC | Age: 60
End: 2025-06-11
Payer: COMMERCIAL

## 2025-06-11 DIAGNOSIS — E78.2 MIXED HYPERLIPIDEMIA: ICD-10-CM

## 2025-06-11 PROCEDURE — RXMED WILLOW AMBULATORY MEDICATION CHARGE

## 2025-06-11 RX ORDER — ROSUVASTATIN CALCIUM 20 MG/1
40 TABLET, COATED ORAL DAILY
Qty: 180 TABLET | Refills: 0 | OUTPATIENT
Start: 2025-06-11

## 2025-06-16 ENCOUNTER — APPOINTMENT (OUTPATIENT)
Dept: CARDIAC REHAB | Facility: CLINIC | Age: 60
End: 2025-06-16
Payer: COMMERCIAL

## 2025-06-18 ENCOUNTER — APPOINTMENT (OUTPATIENT)
Dept: CARDIAC REHAB | Facility: CLINIC | Age: 60
End: 2025-06-18
Payer: COMMERCIAL

## 2025-06-19 DIAGNOSIS — I25.10 CORONARY ARTERY DISEASE, UNSPECIFIED VESSEL OR LESION TYPE, UNSPECIFIED WHETHER ANGINA PRESENT, UNSPECIFIED WHETHER NATIVE OR TRANSPLANTED HEART: ICD-10-CM

## 2025-06-23 ENCOUNTER — APPOINTMENT (OUTPATIENT)
Dept: CARDIAC REHAB | Facility: CLINIC | Age: 60
End: 2025-06-23
Payer: COMMERCIAL

## 2025-06-25 ENCOUNTER — APPOINTMENT (OUTPATIENT)
Dept: CARDIAC REHAB | Facility: CLINIC | Age: 60
End: 2025-06-25
Payer: COMMERCIAL

## 2025-06-30 ENCOUNTER — APPOINTMENT (OUTPATIENT)
Dept: CARDIAC REHAB | Facility: CLINIC | Age: 60
End: 2025-06-30
Payer: COMMERCIAL

## 2025-07-02 ENCOUNTER — APPOINTMENT (OUTPATIENT)
Dept: CARDIAC REHAB | Facility: CLINIC | Age: 60
End: 2025-07-02
Payer: COMMERCIAL

## 2025-07-07 ENCOUNTER — APPOINTMENT (OUTPATIENT)
Dept: CARDIAC REHAB | Facility: CLINIC | Age: 60
End: 2025-07-07
Payer: COMMERCIAL

## 2025-07-09 ENCOUNTER — APPOINTMENT (OUTPATIENT)
Dept: CARDIAC REHAB | Facility: CLINIC | Age: 60
End: 2025-07-09
Payer: COMMERCIAL

## 2025-07-14 ENCOUNTER — APPOINTMENT (OUTPATIENT)
Dept: CARDIAC REHAB | Facility: CLINIC | Age: 60
End: 2025-07-14
Payer: COMMERCIAL

## 2025-07-16 ENCOUNTER — APPOINTMENT (OUTPATIENT)
Dept: CARDIAC REHAB | Facility: CLINIC | Age: 60
End: 2025-07-16
Payer: COMMERCIAL

## 2025-07-21 ENCOUNTER — APPOINTMENT (OUTPATIENT)
Dept: CARDIAC REHAB | Facility: CLINIC | Age: 60
End: 2025-07-21
Payer: COMMERCIAL

## 2025-07-23 ENCOUNTER — APPOINTMENT (OUTPATIENT)
Dept: CARDIAC REHAB | Facility: CLINIC | Age: 60
End: 2025-07-23
Payer: COMMERCIAL

## 2025-07-24 DIAGNOSIS — E78.2 MIXED HYPERLIPIDEMIA: ICD-10-CM

## 2025-07-25 ENCOUNTER — PHARMACY VISIT (OUTPATIENT)
Dept: PHARMACY | Facility: CLINIC | Age: 60
End: 2025-07-25
Payer: COMMERCIAL

## 2025-07-25 PROCEDURE — RXMED WILLOW AMBULATORY MEDICATION CHARGE

## 2025-07-25 RX ORDER — ROSUVASTATIN CALCIUM 20 MG/1
40 TABLET, COATED ORAL DAILY
Qty: 180 TABLET | Refills: 0 | Status: SHIPPED | OUTPATIENT
Start: 2025-07-25

## 2025-07-28 ENCOUNTER — APPOINTMENT (OUTPATIENT)
Dept: CARDIAC REHAB | Facility: CLINIC | Age: 60
End: 2025-07-28
Payer: COMMERCIAL

## 2025-07-30 ENCOUNTER — APPOINTMENT (OUTPATIENT)
Dept: CARDIAC REHAB | Facility: CLINIC | Age: 60
End: 2025-07-30
Payer: COMMERCIAL

## 2025-08-04 ENCOUNTER — APPOINTMENT (OUTPATIENT)
Dept: CARDIAC REHAB | Facility: CLINIC | Age: 60
End: 2025-08-04
Payer: COMMERCIAL

## 2025-08-06 ENCOUNTER — APPOINTMENT (OUTPATIENT)
Dept: CARDIAC REHAB | Facility: CLINIC | Age: 60
End: 2025-08-06
Payer: COMMERCIAL

## 2025-08-08 ENCOUNTER — APPOINTMENT (OUTPATIENT)
Dept: CARDIAC REHAB | Facility: CLINIC | Age: 60
End: 2025-08-08
Payer: COMMERCIAL

## 2025-08-14 ENCOUNTER — OFFICE VISIT (OUTPATIENT)
Dept: CARDIOLOGY | Facility: CLINIC | Age: 60
End: 2025-08-14
Payer: COMMERCIAL

## 2025-08-14 VITALS
OXYGEN SATURATION: 97 % | BODY MASS INDEX: 31.15 KG/M2 | HEIGHT: 72 IN | HEART RATE: 74 BPM | DIASTOLIC BLOOD PRESSURE: 78 MMHG | WEIGHT: 230 LBS | SYSTOLIC BLOOD PRESSURE: 125 MMHG

## 2025-08-14 DIAGNOSIS — I25.118 ATHEROSCLEROTIC HEART DISEASE OF NATIVE CORONARY ARTERY WITH OTHER FORMS OF ANGINA PECTORIS: Primary | ICD-10-CM

## 2025-08-14 DIAGNOSIS — I51.9 RADIATION-INDUCED HEART DISEASE: ICD-10-CM

## 2025-08-14 DIAGNOSIS — I35.0 NONRHEUMATIC AORTIC VALVE STENOSIS: ICD-10-CM

## 2025-08-14 PROCEDURE — 99214 OFFICE O/P EST MOD 30 MIN: CPT | Performed by: INTERNAL MEDICINE

## 2025-08-14 PROCEDURE — 99212 OFFICE O/P EST SF 10 MIN: CPT

## 2025-08-14 PROCEDURE — 3008F BODY MASS INDEX DOCD: CPT | Performed by: INTERNAL MEDICINE

## 2025-08-14 ASSESSMENT — PAIN SCALES - GENERAL: PAINLEVEL_OUTOF10: 0-NO PAIN

## 2025-08-14 ASSESSMENT — ENCOUNTER SYMPTOMS
DEPRESSION: 0
OCCASIONAL FEELINGS OF UNSTEADINESS: 0
LOSS OF SENSATION IN FEET: 1

## 2025-08-22 LAB
CHOLEST SERPL-MCNC: 129 MG/DL
CHOLEST/HDLC SERPL: 2.7 (CALC)
HDLC SERPL-MCNC: 47 MG/DL
LDLC SERPL CALC-MCNC: 63 MG/DL (CALC)
NONHDLC SERPL-MCNC: 82 MG/DL (CALC)
TRIGL SERPL-MCNC: 106 MG/DL

## 2025-08-28 ENCOUNTER — HOSPITAL ENCOUNTER (OUTPATIENT)
Dept: CARDIOLOGY | Facility: CLINIC | Age: 60
Discharge: HOME | End: 2025-08-28
Payer: COMMERCIAL

## 2025-08-28 DIAGNOSIS — I35.0 NONRHEUMATIC AORTIC VALVE STENOSIS: ICD-10-CM

## 2025-08-28 DIAGNOSIS — I25.118 ATHEROSCLEROTIC HEART DISEASE OF NATIVE CORONARY ARTERY WITH OTHER FORMS OF ANGINA PECTORIS: ICD-10-CM

## 2025-08-28 DIAGNOSIS — I51.9 RADIATION-INDUCED HEART DISEASE: ICD-10-CM

## 2025-08-28 LAB
AORTIC VALVE MEAN GRADIENT: 18 MMHG
AORTIC VALVE PEAK VELOCITY: 2.81 M/S
AV PEAK GRADIENT: 32 MMHG
AVA (PEAK VEL): 1.38 CM2
AVA (VTI): 1.36 CM2
EJECTION FRACTION APICAL 4 CHAMBER: 57.5
EJECTION FRACTION: 58 %
LEFT ATRIUM VOLUME AREA LENGTH INDEX BSA: 28.1 ML/M2
LEFT VENTRICLE INTERNAL DIMENSION DIASTOLE: 4.1 CM (ref 3.5–6)
LEFT VENTRICULAR OUTFLOW TRACT DIAMETER: 2.03 CM
MITRAL VALVE E/A RATIO: 1.83
RIGHT VENTRICLE FREE WALL PEAK S': 10 CM/S
TRICUSPID ANNULAR PLANE SYSTOLIC EXCURSION: 22 CM

## 2025-08-28 PROCEDURE — 93306 TTE W/DOPPLER COMPLETE: CPT

## 2025-08-28 PROCEDURE — 93306 TTE W/DOPPLER COMPLETE: CPT | Performed by: INTERNAL MEDICINE

## 2025-08-29 DIAGNOSIS — E66.9 OBESITY, MILD: Primary | ICD-10-CM

## 2025-08-31 PROCEDURE — RXMED WILLOW AMBULATORY MEDICATION CHARGE

## 2025-09-05 ENCOUNTER — PHARMACY VISIT (OUTPATIENT)
Dept: PHARMACY | Facility: CLINIC | Age: 60
End: 2025-09-05
Payer: COMMERCIAL

## 2025-09-05 ENCOUNTER — TELEMEDICINE (OUTPATIENT)
Dept: PHARMACY | Facility: HOSPITAL | Age: 60
End: 2025-09-05
Payer: COMMERCIAL

## 2025-09-05 DIAGNOSIS — E66.9 OBESITY, MILD: ICD-10-CM

## 2025-09-05 PROCEDURE — RXMED WILLOW AMBULATORY MEDICATION CHARGE

## 2025-09-06 ENCOUNTER — PHARMACY VISIT (OUTPATIENT)
Dept: PHARMACY | Facility: CLINIC | Age: 60
End: 2025-09-06
Payer: COMMERCIAL

## 2025-09-24 ENCOUNTER — APPOINTMENT (OUTPATIENT)
Dept: PHARMACY | Facility: HOSPITAL | Age: 60
End: 2025-09-24
Payer: COMMERCIAL

## (undated) DEVICE — DRAPE, TAPE, ORTHO

## (undated) DEVICE — CATHETER, DIAGNOSTIC, 4 FR-JR 4

## (undated) DEVICE — SYRINGE, 5 CC, LUER LOCK

## (undated) DEVICE — GLOVE, SURGICAL, PROTEXIS PI , 7.5, PF, LF

## (undated) DEVICE — GUIDEWIRE, RUN THROUGH WIRE, 180CM

## (undated) DEVICE — INTRODUCER, GLIDESHEATH SLENDER A-KIT, 5FR 10CM

## (undated) DEVICE — ELECTRODE, ELECTROSURGICAL, BLADE, INSULATED, ENT/IMA, STERILE

## (undated) DEVICE — CATHETER, GUIDING, LAUNCHER, 6FR EBU 3.0

## (undated) DEVICE — CATHETER, URETHRAL, ALL PURPOSE, 8FR

## (undated) DEVICE — GUIDEWIRE, 0.035 IN X 145 CM

## (undated) DEVICE — CATHETER, PIGTAIL 155 MOD DIAGNOSTIC, 4 FR (110 CM)

## (undated) DEVICE — GUIDEWIRE, STRAIGHT, HI-TORQUE BALANCE MIDDLEWEIGHT, 0.014 IN X 190 CM, HYDROCOAT

## (undated) DEVICE — CATHETER, WEDGE PRESSURE, BALLOON, DOUBLE LUMEN, 5 FR, 110 CM

## (undated) DEVICE — Device

## (undated) DEVICE — CATHETER, BALLOON, NC EUPHORA NONCOMPLIANT 3.0 X 15 X 142CM

## (undated) DEVICE — INTRODUCER, MICRO, 4FR, 7CM ECHO

## (undated) DEVICE — SLEEVE, VASO PRESS, CALF GARMENT, MEDIUM, GREEN

## (undated) DEVICE — SPONGE, TONSIL, W/STRING, RADIOPAQUE, MEDIUM, 7/8 IN

## (undated) DEVICE — TR BAND, RADIAL COMPRESSION, STANDARD, 24CM

## (undated) DEVICE — CATHETER, GUIDING, LAUNCHER, 6FR, JL 3.5

## (undated) DEVICE — CATHETER, DIAGNOSTIC, 4 FR-JL 4.5

## (undated) DEVICE — TIP, SUCTION, YANKAUER, BULB, ADULT

## (undated) DEVICE — INFLATION KIT, ADVANTAGE, ENCORE 26 (1/BOX)

## (undated) DEVICE — TR BAND, RADIAL COMPRESSION, LONG, 29CM

## (undated) DEVICE — GUIDEWIRE, INQWIRE, 3MM J, .035, 150

## (undated) DEVICE — GUIDEWIRE, INQUIRE, J TIP, .035 X 210CM, FIXED CORE, DIAGNOSTIC

## (undated) DEVICE — INTRODUCER, GLIDESHEATH SLENDER A-KIT, 6FR 10CM

## (undated) DEVICE — CATHETER, GUIDING, LAUNCHER, 6FR, JL 3.0

## (undated) DEVICE — CATHETER, BALLOON DILATION, EUPHORA SEMICOMPLIANT 2.50  X 12 MM X 142CM

## (undated) DEVICE — SOLUTION, IRRIGATION, X RX SODIUM CHL 0.9%, 1000ML BTL

## (undated) DEVICE — DEFOGGER, LIQUID SOLUTION, FOAM, ROUND BTL

## (undated) DEVICE — CATHETER, BALLOON, NC EUPHORA NONCOMPLIANT 3.25 X 15 X 142CM

## (undated) DEVICE — CATHETER, DRAGONFLY, OPSTAR